# Patient Record
Sex: FEMALE | Race: WHITE | NOT HISPANIC OR LATINO
[De-identification: names, ages, dates, MRNs, and addresses within clinical notes are randomized per-mention and may not be internally consistent; named-entity substitution may affect disease eponyms.]

---

## 2017-01-12 ENCOUNTER — APPOINTMENT (OUTPATIENT)
Dept: PULMONOLOGY | Facility: CLINIC | Age: 67
End: 2017-01-12

## 2017-01-12 VITALS
DIASTOLIC BLOOD PRESSURE: 70 MMHG | SYSTOLIC BLOOD PRESSURE: 115 MMHG | WEIGHT: 135 LBS | BODY MASS INDEX: 22.47 KG/M2 | OXYGEN SATURATION: 96 % | HEART RATE: 69 BPM

## 2017-03-13 ENCOUNTER — MEDICATION RENEWAL (OUTPATIENT)
Age: 67
End: 2017-03-13

## 2017-04-05 ENCOUNTER — RX RENEWAL (OUTPATIENT)
Age: 67
End: 2017-04-05

## 2017-05-02 ENCOUNTER — RX RENEWAL (OUTPATIENT)
Age: 67
End: 2017-05-02

## 2017-05-11 ENCOUNTER — APPOINTMENT (OUTPATIENT)
Dept: PULMONOLOGY | Facility: CLINIC | Age: 67
End: 2017-05-11

## 2017-05-11 VITALS
BODY MASS INDEX: 23.32 KG/M2 | WEIGHT: 140 LBS | SYSTOLIC BLOOD PRESSURE: 112 MMHG | DIASTOLIC BLOOD PRESSURE: 80 MMHG | RESPIRATION RATE: 14 BRPM | HEART RATE: 70 BPM | OXYGEN SATURATION: 96 % | HEIGHT: 65 IN

## 2017-07-02 ENCOUNTER — RX RENEWAL (OUTPATIENT)
Age: 67
End: 2017-07-02

## 2017-07-30 ENCOUNTER — RX RENEWAL (OUTPATIENT)
Age: 67
End: 2017-07-30

## 2017-09-27 ENCOUNTER — RX RENEWAL (OUTPATIENT)
Age: 67
End: 2017-09-27

## 2017-10-19 ENCOUNTER — RX RENEWAL (OUTPATIENT)
Age: 67
End: 2017-10-19

## 2017-10-26 ENCOUNTER — APPOINTMENT (OUTPATIENT)
Dept: PULMONOLOGY | Facility: CLINIC | Age: 67
End: 2017-10-26
Payer: MEDICARE

## 2017-10-26 VITALS
WEIGHT: 140 LBS | OXYGEN SATURATION: 98 % | DIASTOLIC BLOOD PRESSURE: 80 MMHG | HEIGHT: 65 IN | HEART RATE: 61 BPM | SYSTOLIC BLOOD PRESSURE: 118 MMHG | BODY MASS INDEX: 23.32 KG/M2

## 2017-10-26 PROCEDURE — 94010 BREATHING CAPACITY TEST: CPT

## 2017-10-26 PROCEDURE — G0008: CPT

## 2017-10-26 PROCEDURE — 90662 IIV NO PRSV INCREASED AG IM: CPT

## 2017-10-26 PROCEDURE — 99214 OFFICE O/P EST MOD 30 MIN: CPT | Mod: 25

## 2017-10-27 ENCOUNTER — RX RENEWAL (OUTPATIENT)
Age: 67
End: 2017-10-27

## 2017-11-01 ENCOUNTER — APPOINTMENT (OUTPATIENT)
Dept: PULMONOLOGY | Facility: CLINIC | Age: 67
End: 2017-11-01

## 2017-12-27 ENCOUNTER — RX RENEWAL (OUTPATIENT)
Age: 67
End: 2017-12-27

## 2018-01-03 ENCOUNTER — RX RENEWAL (OUTPATIENT)
Age: 68
End: 2018-01-03

## 2018-02-10 ENCOUNTER — RX RENEWAL (OUTPATIENT)
Age: 68
End: 2018-02-10

## 2018-03-01 ENCOUNTER — APPOINTMENT (OUTPATIENT)
Dept: PULMONOLOGY | Facility: CLINIC | Age: 68
End: 2018-03-01
Payer: MEDICARE

## 2018-03-01 VITALS
BODY MASS INDEX: 23.49 KG/M2 | WEIGHT: 141 LBS | OXYGEN SATURATION: 100 % | SYSTOLIC BLOOD PRESSURE: 100 MMHG | DIASTOLIC BLOOD PRESSURE: 62 MMHG | HEIGHT: 65 IN | RESPIRATION RATE: 14 BRPM | HEART RATE: 68 BPM

## 2018-03-01 DIAGNOSIS — Z23 ENCOUNTER FOR IMMUNIZATION: ICD-10-CM

## 2018-03-01 PROCEDURE — 90732 PPSV23 VACC 2 YRS+ SUBQ/IM: CPT

## 2018-03-01 PROCEDURE — 94010 BREATHING CAPACITY TEST: CPT

## 2018-03-01 PROCEDURE — 99214 OFFICE O/P EST MOD 30 MIN: CPT | Mod: 25

## 2018-03-01 PROCEDURE — G0009: CPT

## 2018-03-01 RX ORDER — AZELASTINE HYDROCHLORIDE 137 UG/1
0.1 SPRAY, METERED NASAL TWICE DAILY
Qty: 3 | Refills: 1 | Status: DISCONTINUED | COMMUNITY
Start: 2017-10-26 | End: 2018-03-01

## 2018-03-01 RX ORDER — OLOPATADINE HYDROCHLORIDE 665 UG/1
0.6 SPRAY, METERED NASAL
Qty: 3 | Refills: 1 | Status: DISCONTINUED | COMMUNITY
Start: 2017-05-11 | End: 2018-03-01

## 2018-03-28 ENCOUNTER — RX RENEWAL (OUTPATIENT)
Age: 68
End: 2018-03-28

## 2018-04-21 ENCOUNTER — RX RENEWAL (OUTPATIENT)
Age: 68
End: 2018-04-21

## 2018-06-07 ENCOUNTER — RX RENEWAL (OUTPATIENT)
Age: 68
End: 2018-06-07

## 2018-06-23 ENCOUNTER — RX RENEWAL (OUTPATIENT)
Age: 68
End: 2018-06-23

## 2018-06-25 ENCOUNTER — RX RENEWAL (OUTPATIENT)
Age: 68
End: 2018-06-25

## 2018-07-02 ENCOUNTER — APPOINTMENT (OUTPATIENT)
Dept: PULMONOLOGY | Facility: CLINIC | Age: 68
End: 2018-07-02

## 2018-07-05 ENCOUNTER — NON-APPOINTMENT (OUTPATIENT)
Age: 68
End: 2018-07-05

## 2018-07-05 ENCOUNTER — APPOINTMENT (OUTPATIENT)
Dept: PULMONOLOGY | Facility: CLINIC | Age: 68
End: 2018-07-05
Payer: MEDICARE

## 2018-07-05 VITALS
SYSTOLIC BLOOD PRESSURE: 120 MMHG | BODY MASS INDEX: 23.32 KG/M2 | OXYGEN SATURATION: 98 % | WEIGHT: 140 LBS | HEIGHT: 65 IN | DIASTOLIC BLOOD PRESSURE: 80 MMHG | HEART RATE: 78 BPM

## 2018-07-05 PROCEDURE — 99214 OFFICE O/P EST MOD 30 MIN: CPT | Mod: 25

## 2018-07-05 PROCEDURE — 94010 BREATHING CAPACITY TEST: CPT

## 2018-07-18 ENCOUNTER — RX RENEWAL (OUTPATIENT)
Age: 68
End: 2018-07-18

## 2018-09-12 ENCOUNTER — RX RENEWAL (OUTPATIENT)
Age: 68
End: 2018-09-12

## 2018-09-21 ENCOUNTER — RX RENEWAL (OUTPATIENT)
Age: 68
End: 2018-09-21

## 2018-10-22 ENCOUNTER — RX RENEWAL (OUTPATIENT)
Age: 68
End: 2018-10-22

## 2018-10-24 ENCOUNTER — APPOINTMENT (OUTPATIENT)
Dept: PULMONOLOGY | Facility: CLINIC | Age: 68
End: 2018-10-24
Payer: MEDICARE

## 2018-10-24 PROCEDURE — G0008: CPT

## 2018-10-24 PROCEDURE — 90686 IIV4 VACC NO PRSV 0.5 ML IM: CPT

## 2018-11-01 ENCOUNTER — APPOINTMENT (OUTPATIENT)
Dept: PULMONOLOGY | Facility: CLINIC | Age: 68
End: 2018-11-01

## 2018-12-05 ENCOUNTER — APPOINTMENT (OUTPATIENT)
Dept: PULMONOLOGY | Facility: CLINIC | Age: 68
End: 2018-12-05
Payer: MEDICARE

## 2018-12-05 ENCOUNTER — NON-APPOINTMENT (OUTPATIENT)
Age: 68
End: 2018-12-05

## 2018-12-05 VITALS
WEIGHT: 142 LBS | RESPIRATION RATE: 16 BRPM | DIASTOLIC BLOOD PRESSURE: 90 MMHG | HEART RATE: 66 BPM | BODY MASS INDEX: 24.24 KG/M2 | SYSTOLIC BLOOD PRESSURE: 130 MMHG | OXYGEN SATURATION: 98 % | HEIGHT: 64 IN

## 2018-12-05 PROCEDURE — 99214 OFFICE O/P EST MOD 30 MIN: CPT | Mod: 25

## 2018-12-05 PROCEDURE — 94010 BREATHING CAPACITY TEST: CPT

## 2018-12-05 RX ORDER — LORAZEPAM 0.5 MG/1
0.5 TABLET ORAL
Qty: 60 | Refills: 0 | Status: ACTIVE | COMMUNITY
Start: 2018-07-03

## 2018-12-05 NOTE — PROCEDURE
[FreeTextEntry1] : PFT - spi reveals mild restrictive dysfunction; FEV1 is 1.62 which is 71% of predicted, normal flow volume loop

## 2018-12-05 NOTE — REASON FOR VISIT
[Acute] : an acute visit [FreeTextEntry1] : sick visit; allergies, elevated IgE, eosinophilia, asthma, SOB;

## 2018-12-05 NOTE — ADDENDUM
[FreeTextEntry1] : All medical record entries made by conrad Sanchez were at Dr. Dorian Rojas's, direction and personally dictated by me on (12/5/2018). I have reviewed the chart and agree that the record accurately reflects my personal performance of the history, physical exam, assessment and plan. I have also personally directed, reviewed, and agree with the discharge instructions.

## 2018-12-05 NOTE — ASSESSMENT
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 She is doing well from a pulmonary perspective aside from her mild allergy symptoms. \par \par problem 1: moderate persistent asthma\par -continue to use Advair 250 at 1 inhalation BID\par -continue to use Spiriva 2 inhalations QD\par -continue to use Singulair 10 mg before bed\par \par -Asthma is believed to be caused by inherited (genetic) and environmental factor, but its exact cause is unknown. Asthma may be triggered by allergens, lung infections, or irritants in the air. Asthma triggers are different for each person\par -Inhaler technique reviewed as well as oral hygiene techniques reviewed with patient. Avoidance of cold air, extremes of temperature, rescue inhaler should be used before exercise. Order of medication reviewed with patient. Recommended use of a cool mist humidifier in the bedroom.\par \par problem 2: elevated IgE level and eosinophilia\par -she remains a candidate for Xolair and Nucala, respectfully \par -no need to implement at this point in time\par \par -The safety and efficacy of Nucala was established in three double-blind, randomized, placebo-controlled trials in patients with severe asthma. Compared to a placebo, patients with severe asthma receiving Nucala had fewer exacerbation requiring hospitalization and/or emergency department visits, and a longer time to first exacerbation. In addition, patients with severe asthma receiving Nucala experienced greater reductions in their daily maintenance oral corticosteroid dose, while maintaining asthma control compared with patients receiving placebo. Treatment with Nucala did not result in a significant improvement in lung function, as measured by the volume of air exhaled by patients in one second. The most common side effects include: headache, injection site reactions, back pain, weakness, and fatigue; hypersensitivity reactions can occur within hours or days including swelling of the face, mouth, and tongue, fainting, dizziness, hives, breathing problems, and rash; herpes zoster infections have occurred. The drug is a monoclonal antibody that inhibits interleukin -5 which helps regular eosinophils, a type of white blood cell that contributes to asthma. The over-production of eosinophils can cause inflammation in the lungs, increasing the frequency of asthma attacks. Patients must also take other medications, including high dose inhaled corticosteroids and at least one additional asthma drug.\par -Xolair is a recombinant DNA- derived humanized IgG1K monoclonal antibody that selectively binds ot human immunoglobulin E (IgE). Xolair is produced by a Chinese hamster ovary cell suspension culture in nutrient medium containing the antibiotic gentamicin. Gentamicin is not detectable in the final product. Xolair is a sterile, white, preservative free, lyophilized powder contained in a single use vial that is reconstituted with sterile water for suspension. Side effects include: wheezing, tightness of the chest, trouble breathing, hives, skin rash, feeling anxious or light-headed, fainting, warmth or tingling under skin, or swelling of face, lips, or tongue \par \par problem 3: allergic rhinitis\par -continue to use Clarinex 5 mg QHS\par -she is to continue nasal saline\par -followed by Astelin 0.15% BID\par -Xlear recommended\par -Environmental measures for allergies were encouraged including mattress and pillow cover, air purifier, and environmental controls. \par \par problem 4: URI\par -recommended to take Aida seltzer cold and sinus\par \par problem 5: poor sleep\par Good sleep hygiene was encouraged including avoiding watching television an hour before bed, keeping caffeine at a low, avoiding reading, television, or anything, in bed, no drinking any liquids three hours before bedtime, and only getting into bed when tired and ready for sleep. \par \par \par problem 8: health maintenance \par -flu shot - 2018\par -Pneumovax 23 given March 2018\par -she is being added CoQ-10 at 200 mg QD \par -recommended early intervention for URIs\par -recommended regular osteoporosis evaluations\par -recommended early dermatological evaluations\par -recommended after the age of 50 to the age of 70, colonoscopy every 5 years \par \par F/U in 4-6 months\par She is encouraged to call with any changes, concerns, or questions.

## 2018-12-05 NOTE — HISTORY OF PRESENT ILLNESS
[FreeTextEntry1] : Ms. Henson is a 68 year old female with a history of allergies, elevated IgE, eosinophilia, asthma, SOB, who presents to the office for a follow up visit. Her chief complaint is poor sleep.\par -she states that two days ago her sickness started, with coughing, nasal congestion and throat irritation. Some mucous comes up\par -she notes she has chronic itchy eyes; dependant of the weather\par -she reports that her breathing has been good\par -she states she gets SOB when she was climbing stairs in October but this did not reoccur\par -she reports that her bowels are regular and her senses of smell and taste are normal\par -she reports that she has trouble falling asleep\par -she denies any headaches, nausea, vomiting, fever, chills, sweats, chest pain, chest pressure, diarrhea, constipation, dysphagia, dizziness, leg swelling, leg pain, itchy ears, heartburn, reflux, or sour taste in the mouth.

## 2018-12-17 ENCOUNTER — RX RENEWAL (OUTPATIENT)
Age: 68
End: 2018-12-17

## 2019-01-14 ENCOUNTER — RX RENEWAL (OUTPATIENT)
Age: 69
End: 2019-01-14

## 2019-03-05 ENCOUNTER — RX RENEWAL (OUTPATIENT)
Age: 69
End: 2019-03-05

## 2019-03-21 ENCOUNTER — RX RENEWAL (OUTPATIENT)
Age: 69
End: 2019-03-21

## 2019-04-10 ENCOUNTER — APPOINTMENT (OUTPATIENT)
Dept: PULMONOLOGY | Facility: CLINIC | Age: 69
End: 2019-04-10
Payer: MEDICARE

## 2019-04-10 VITALS
BODY MASS INDEX: 23.32 KG/M2 | RESPIRATION RATE: 14 BRPM | SYSTOLIC BLOOD PRESSURE: 120 MMHG | OXYGEN SATURATION: 97 % | HEART RATE: 63 BPM | WEIGHT: 140 LBS | DIASTOLIC BLOOD PRESSURE: 80 MMHG | HEIGHT: 65 IN

## 2019-04-10 PROCEDURE — 99214 OFFICE O/P EST MOD 30 MIN: CPT | Mod: 25

## 2019-04-10 PROCEDURE — 94010 BREATHING CAPACITY TEST: CPT

## 2019-04-10 NOTE — HISTORY OF PRESENT ILLNESS
[FreeTextEntry1] : Ms. Henson is a 68 year old female with a history of allergies, elevated IgE, eosinophilia, asthma, SOB, who presents to the office for a follow up visit. Her chief complaint is weight. \par -she notes occasional vertigo. \par -she notes she is sleeping better. about 6 hours each night, waking up rested. \par -she notes she is exercising, using a stepper. \par -she notes she has chronic itchy eyes; dependant of the weather\par -she reports that her breathing has been good\par -she states she gets SOB when she was climbing stairs in October but this did not reoccur\par -she reports that her bowels are regular and her senses of smell and taste are normal\par -she denies any headaches, nausea, vomiting, fever, chills, sweats, chest pain, chest pressure, diarrhea, constipation, dysphagia, dizziness, leg swelling, leg pain, itchy ears, heartburn, reflux, or sour taste in the mouth.

## 2019-04-10 NOTE — PHYSICAL EXAM
[General Appearance - Well Developed] : well developed [Normal Appearance] : normal appearance [Well Groomed] : well groomed [General Appearance - Well Nourished] : well nourished [General Appearance - In No Acute Distress] : no acute distress [No Deformities] : no deformities [Normal Conjunctiva] : the conjunctiva exhibited no abnormalities [Eyelids - No Xanthelasma] : the eyelids demonstrated no xanthelasmas [Normal Oropharynx] : normal oropharynx [Neck Appearance] : the appearance of the neck was normal [II] : II [Neck Cervical Mass (___cm)] : no neck mass was observed [Jugular Venous Distention Increased] : there was no jugular-venous distention [Thyroid Diffuse Enlargement] : the thyroid was not enlarged [Thyroid Nodule] : there were no palpable thyroid nodules [Heart Rate And Rhythm] : heart rate and rhythm were normal [Heart Sounds] : normal S1 and S2 [Murmurs] : no murmurs present [Respiration, Rhythm And Depth] : normal respiratory rhythm and effort [Exaggerated Use Of Accessory Muscles For Inspiration] : no accessory muscle use [Auscultation Breath Sounds / Voice Sounds] : lungs were clear to auscultation bilaterally [Abdomen Soft] : soft [Abdomen Tenderness] : non-tender [Abdomen Mass (___ Cm)] : no abdominal mass palpated [Abnormal Walk] : normal gait [Gait - Sufficient For Exercise Testing] : the gait was sufficient for exercise testing [Nail Clubbing] : no clubbing of the fingernails [Cyanosis, Localized] : no localized cyanosis [Petechial Hemorrhages (___cm)] : no petechial hemorrhages [Skin Color & Pigmentation] : normal skin color and pigmentation [] : no rash [No Venous Stasis] : no venous stasis [Skin Lesions] : no skin lesions [No Skin Ulcers] : no skin ulcer [No Xanthoma] : no  xanthoma was observed [Deep Tendon Reflexes (DTR)] : deep tendon reflexes were 2+ and symmetric [Sensation] : the sensory exam was normal to light touch and pinprick [No Focal Deficits] : no focal deficits [Oriented To Time, Place, And Person] : oriented to person, place, and time [Impaired Insight] : insight and judgment were intact [Affect] : the affect was normal [FreeTextEntry1] : I:E ratio 1:3; clear

## 2019-04-10 NOTE — ADDENDUM
[FreeTextEntry1] : Documented by Najma Quach acting as a scribe for Dr. Dorian Rojas on 4/10/2019.\par \par All medical record entries made by the scribe, Najma Quach, were at my, Dr. Dorian Rojas's, direction and personally dictated by me on 4/10/2019. I have reviewed the chart and agree that the record accurately reflects my personal performance of the history, physical exam, assessment and plan. I have also personally directed, reviewed, and agree with the discharge instructions.

## 2019-04-10 NOTE — REASON FOR VISIT
[Follow-Up] : a follow-up visit [FreeTextEntry1] : allergies, elevated IgE, eosinophilic asthma, SOB;

## 2019-04-10 NOTE — ASSESSMENT
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 She is doing well from a pulmonary perspective aside from her mild allergy symptoms. (controlled)\par \par problem 1: moderate persistent asthma\par -continue to use Advair 250 at 1 inhalation BID\par -continue to use Spiriva 2 inhalations QD\par -continue to use Singulair 10 mg before bed\par \par -Asthma is believed to be caused by inherited (genetic) and environmental factor, but its exact cause is unknown. Asthma may be triggered by allergens, lung infections, or irritants in the air. Asthma triggers are different for each person\par -Inhaler technique reviewed as well as oral hygiene techniques reviewed with patient. Avoidance of cold air, extremes of temperature, rescue inhaler should be used before exercise. Order of medication reviewed with patient. Recommended use of a cool mist humidifier in the bedroom.\par \par problem 2: elevated IgE level and eosinophilia\par -she remains a candidate for Xolair and Nucala, respectfully \par -no need to implement at this point in time\par \par -The safety and efficacy of Nucala was established in three double-blind, randomized, placebo-controlled trials in patients with severe asthma. Compared to a placebo, patients with severe asthma receiving Nucala had fewer exacerbation requiring hospitalization and/or emergency department visits, and a longer time to first exacerbation. In addition, patients with severe asthma receiving Nucala experienced greater reductions in their daily maintenance oral corticosteroid dose, while maintaining asthma control compared with patients receiving placebo. Treatment with Nucala did not result in a significant improvement in lung function, as measured by the volume of air exhaled by patients in one second. The most common side effects include: headache, injection site reactions, back pain, weakness, and fatigue; hypersensitivity reactions can occur within hours or days including swelling of the face, mouth, and tongue, fainting, dizziness, hives, breathing problems, and rash; herpes zoster infections have occurred. The drug is a monoclonal antibody that inhibits interleukin -5 which helps regular eosinophils, a type of white blood cell that contributes to asthma. The over-production of eosinophils can cause inflammation in the lungs, increasing the frequency of asthma attacks. Patients must also take other medications, including high dose inhaled corticosteroids and at least one additional asthma drug.\par -Xolair is a recombinant DNA- derived humanized IgG1K monoclonal antibody that selectively binds ot human immunoglobulin E (IgE). Xolair is produced by a Chinese hamster ovary cell suspension culture in nutrient medium containing the antibiotic gentamicin. Gentamicin is not detectable in the final product. Xolair is a sterile, white, preservative free, lyophilized powder contained in a single use vial that is reconstituted with sterile water for suspension. Side effects include: wheezing, tightness of the chest, trouble breathing, hives, skin rash, feeling anxious or light-headed, fainting, warmth or tingling under skin, or swelling of face, lips, or tongue \par \par problem 3: allergic rhinitis\par -continue to use Clarinex 5 mg QHS\par -she is to continue nasal saline\par -followed by Astelin 0.15% BID\par -Xlear recommended\par -Environmental measures for allergies were encouraged including mattress and pillow cover, air purifier, and environmental controls. \par \par problem 4: URI (if present)\par -recommended to take Aida seltzer cold and sinus\par \par problem 5: poor sleep (improved)\par Good sleep hygiene was encouraged including avoiding watching television an hour before bed, keeping caffeine at a low, avoiding reading, television, or anything, in bed, no drinking any liquids three hours before bedtime, and only getting into bed when tired and ready for sleep. \par \par \par problem 8: health maintenance \par -flu shot - 2018\par -Pneumovax 23 given March 2018/ [id number] 11/2016\par -she is being added CoQ-10 at 200 mg QD \par -recommended early intervention for URIs\par -recommended regular osteoporosis evaluations\par -recommended early dermatological evaluations\par -recommended after the age of 50 to the age of 68, colonoscopy every 5 years \par \par F/U in 4-6 months\par She is encouraged to call with any changes, concerns, or questions.

## 2019-04-10 NOTE — PROCEDURE
[FreeTextEntry1] : PFT - spi reveals mild-mod obstructive dysfunction; FEV1 is 1.51 which is 65% of predicted, normal flow volume loop \par \par Patient refused the NiOx/FENO testing.

## 2019-06-12 ENCOUNTER — RX RENEWAL (OUTPATIENT)
Age: 69
End: 2019-06-12

## 2019-06-14 ENCOUNTER — RX RENEWAL (OUTPATIENT)
Age: 69
End: 2019-06-14

## 2019-07-14 ENCOUNTER — RX RENEWAL (OUTPATIENT)
Age: 69
End: 2019-07-14

## 2019-07-25 ENCOUNTER — RX RENEWAL (OUTPATIENT)
Age: 69
End: 2019-07-25

## 2019-08-14 ENCOUNTER — NON-APPOINTMENT (OUTPATIENT)
Age: 69
End: 2019-08-14

## 2019-08-14 ENCOUNTER — APPOINTMENT (OUTPATIENT)
Dept: PULMONOLOGY | Facility: CLINIC | Age: 69
End: 2019-08-14
Payer: MEDICARE

## 2019-08-14 VITALS
HEART RATE: 70 BPM | SYSTOLIC BLOOD PRESSURE: 120 MMHG | HEIGHT: 65 IN | RESPIRATION RATE: 14 BRPM | BODY MASS INDEX: 24.32 KG/M2 | OXYGEN SATURATION: 95 % | DIASTOLIC BLOOD PRESSURE: 80 MMHG | WEIGHT: 146 LBS

## 2019-08-14 PROCEDURE — 94010 BREATHING CAPACITY TEST: CPT

## 2019-08-14 PROCEDURE — 99214 OFFICE O/P EST MOD 30 MIN: CPT | Mod: 25

## 2019-08-14 NOTE — HISTORY OF PRESENT ILLNESS
[FreeTextEntry1] : Ms. Henson is a 68 year old female with a history of allergic rhinitis, elevated IgE, eosinophilic asthma, severe persistent asthma, and SOB presenting to the office today for a follow up visit. Her chief complaint is\par -She states that she has generally been feeling well \par -she has been sleeping well, getting about 7 hours of uninterrupted sleep per night\par -she reports occasional constipation \par -she notes that she has been exercising regularly, walking about 1 mile per day\par -her vision has been stable\par -she reports that her breathing has been generally stable/manageable; she will occasionally become SOB during a hot/humid day \par -she has frequent itchy eyes at night due to allergies. she uses OTC eye drops\par -she states that her bowels have been regular\par -she reports that her sense of taste and smell have been good \par -she denies any headaches, nausea, vomiting, fever, chills, sweats, chest pain, chest pressure, diarrhea, dysphagia, dizziness, leg swelling, leg pain, itchy ears, heartburn, reflux, or sour taste in the mouth, hoarseness.

## 2019-08-14 NOTE — REASON FOR VISIT
[Follow-Up] : a follow-up visit [FreeTextEntry1] : allergic rhinitis, elevated IgE, eosinophilic asthma, severe persistent asthma, and SOB

## 2019-08-14 NOTE — ADDENDUM
[FreeTextEntry1] : All medical record entries made by conrad Franklin were at Dr. Dorian Rojas's, direction and personally dictated by me on 08/14/2019. I have reviewed the chart and agree that the record accurately reflects my personal performance of the history, physical exam, assessment and plan. I have also personally directed, reviewed, and agree with the discharge instructions.

## 2019-08-14 NOTE — PHYSICAL EXAM
[Normal Appearance] : normal appearance [General Appearance - Well Developed] : well developed [Well Groomed] : well groomed [General Appearance - Well Nourished] : well nourished [No Deformities] : no deformities [General Appearance - In No Acute Distress] : no acute distress [Eyelids - No Xanthelasma] : the eyelids demonstrated no xanthelasmas [Normal Conjunctiva] : the conjunctiva exhibited no abnormalities [Normal Oropharynx] : normal oropharynx [Neck Appearance] : the appearance of the neck was normal [Jugular Venous Distention Increased] : there was no jugular-venous distention [Neck Cervical Mass (___cm)] : no neck mass was observed [Thyroid Nodule] : there were no palpable thyroid nodules [Thyroid Diffuse Enlargement] : the thyroid was not enlarged [Heart Sounds] : normal S1 and S2 [Heart Rate And Rhythm] : heart rate and rhythm were normal [Murmurs] : no murmurs present [Respiration, Rhythm And Depth] : normal respiratory rhythm and effort [Exaggerated Use Of Accessory Muscles For Inspiration] : no accessory muscle use [Auscultation Breath Sounds / Voice Sounds] : lungs were clear to auscultation bilaterally [Abdomen Soft] : soft [Abdomen Tenderness] : non-tender [Abdomen Mass (___ Cm)] : no abdominal mass palpated [Gait - Sufficient For Exercise Testing] : the gait was sufficient for exercise testing [Abnormal Walk] : normal gait [Nail Clubbing] : no clubbing of the fingernails [Cyanosis, Localized] : no localized cyanosis [Petechial Hemorrhages (___cm)] : no petechial hemorrhages [Skin Color & Pigmentation] : normal skin color and pigmentation [] : no rash [Skin Lesions] : no skin lesions [No Venous Stasis] : no venous stasis [No Skin Ulcers] : no skin ulcer [No Xanthoma] : no  xanthoma was observed [Sensation] : the sensory exam was normal to light touch and pinprick [Deep Tendon Reflexes (DTR)] : deep tendon reflexes were 2+ and symmetric [Oriented To Time, Place, And Person] : oriented to person, place, and time [No Focal Deficits] : no focal deficits [Affect] : the affect was normal [Impaired Insight] : insight and judgment were intact [II] : II [FreeTextEntry1] : I:E ratio 1:3; clear

## 2019-08-14 NOTE — PROCEDURE
[FreeTextEntry1] : PFT - spi reveals mild obstructive / restrictive dysfunction; FEV1 is 1.51 which is 64% of predicted, normal flow volume loop \par \par *Unable to perform. Pt refused after trying*

## 2019-08-14 NOTE — ASSESSMENT
[FreeTextEntry1] : Ms. Henson has a history of eosinophilic asthma, elevated IgE, allergies, HLD, and anxiety. She is doing well from a pulmonary perspective.\par \par Her shortness of breath is multifactorial due to:\par -poor mechanics of breathing \par -out of shape\par -pulmonary disease  \par \par problem 1: moderate persistent asthma\par -continue to use Advair 250 at 1 inhalation BID\par -continue to use Spiriva 2 inhalations QD\par -continue to use Singulair 10 mg before bed\par \par -Asthma is believed to be caused by inherited (genetic) and environmental factor, but its exact cause is unknown. Asthma may be triggered by allergens, lung infections, or irritants in the air. Asthma triggers are different for each person\par -Inhaler technique reviewed as well as oral hygiene techniques reviewed with patient. Avoidance of cold air, extremes of temperature, rescue inhaler should be used before exercise. Order of medication reviewed with patient. Recommended use of a cool mist humidifier in the bedroom.\par \par problem 2: elevated IgE level and eosinophilia\par -she remains a candidate for Xolair and Nucala, respectfully \par -no need to implement at this point in time\par \par -The safety and efficacy of Nucala was established in three double-blind, randomized, placebo-controlled trials in patients with severe asthma. Compared to a placebo, patients with severe asthma receiving Nucala had fewer exacerbation requiring hospitalization and/or emergency department visits, and a longer time to first exacerbation. In addition, patients with severe asthma receiving Nucala experienced greater reductions in their daily maintenance oral corticosteroid dose, while maintaining asthma control compared with patients receiving placebo. Treatment with Nucala did not result in a significant improvement in lung function, as measured by the volume of air exhaled by patients in one second. The most common side effects include: headache, injection site reactions, back pain, weakness, and fatigue; hypersensitivity reactions can occur within hours or days including swelling of the face, mouth, and tongue, fainting, dizziness, hives, breathing problems, and rash; herpes zoster infections have occurred. The drug is a monoclonal antibody that inhibits interleukin -5 which helps regular eosinophils, a type of white blood cell that contributes to asthma. The over-production of eosinophils can cause inflammation in the lungs, increasing the frequency of asthma attacks. Patients must also take other medications, including high dose inhaled corticosteroids and at least one additional asthma drug.\par -Xolair is a recombinant DNA- derived humanized IgG1K monoclonal antibody that selectively binds ot human immunoglobulin E (IgE). Xolair is produced by a Chinese hamster ovary cell suspension culture in nutrient medium containing the antibiotic gentamicin. Gentamicin is not detectable in the final product. Xolair is a sterile, white, preservative free, lyophilized powder contained in a single use vial that is reconstituted with sterile water for suspension. Side effects include: wheezing, tightness of the chest, trouble breathing, hives, skin rash, feeling anxious or light-headed, fainting, warmth or tingling under skin, or swelling of face, lips, or tongue \par \par problem 3: allergic rhinitis\par -continue to use Clarinex 5 mg QHS\par -recommended to use Xlear nasal saline spray \par -continue Astelin 0.15% BID\par -Environmental measures for allergies were encouraged including mattress and pillow cover, air purifier, and environmental controls. \par \par problem 4: URI (if present)\par -recommended to take Aida seltzer cold and sinus\par \par problem 5: poor sleep (improved)\par Good sleep hygiene was encouraged including avoiding watching television an hour before bed, keeping caffeine at a low, avoiding reading, television, or anything, in bed, no drinking any liquids three hours before bedtime, and only getting into bed when tired and ready for sleep. \par \par problem 6: poor breathing mechanics\par -Proper breathing techniques were reviewed with an emphasis of exhalation. Patient instructed to breath in for 1 second and out for four seconds. Patient was encouraged to not talk while walking. \par \par problem 7: out of shape\par -Weight loss, exercise, and diet control were discussed and are highly encouraged. Treatment options were given such as, aqua therapy, and contacting a nutritionist. Recommended to use the elliptical, stationary bike, less use of treadmill. Mindful eating was explained to the patient Obesity is associated with worsening asthma, shortness of breath, and potential for cardiac disease, diabetes, and other underlying medical conditions\par \par problem 8: anxiety\par -recommended to read: "The Gift of Maybe," by Marta Gonzalez \par \par problem 9:  health maintenance \par -flu shot - 2018\par -Pneumovax 23 given March 2018/ [id number] 11/2016\par -she is being added CoQ-10 at 200 mg QD \par -recommended early intervention for URIs\par -recommended regular osteoporosis evaluations\par -recommended early dermatological evaluations\par -recommended after the age of 50 to the age of 70, colonoscopy every 5 years \par \par F/U in 4-6 months\par She is encouraged to call with any changes, concerns, or questions.

## 2019-08-25 ENCOUNTER — RX RENEWAL (OUTPATIENT)
Age: 69
End: 2019-08-25

## 2019-10-08 ENCOUNTER — RX RENEWAL (OUTPATIENT)
Age: 69
End: 2019-10-08

## 2019-10-10 ENCOUNTER — APPOINTMENT (OUTPATIENT)
Dept: PULMONOLOGY | Facility: CLINIC | Age: 69
End: 2019-10-10
Payer: MEDICARE

## 2019-10-10 PROCEDURE — G0008: CPT

## 2019-10-10 PROCEDURE — 90662 IIV NO PRSV INCREASED AG IM: CPT

## 2019-10-30 ENCOUNTER — APPOINTMENT (OUTPATIENT)
Dept: PULMONOLOGY | Facility: CLINIC | Age: 69
End: 2019-10-30

## 2019-12-06 ENCOUNTER — RX RENEWAL (OUTPATIENT)
Age: 69
End: 2019-12-06

## 2019-12-09 ENCOUNTER — RX RENEWAL (OUTPATIENT)
Age: 69
End: 2019-12-09

## 2019-12-18 ENCOUNTER — APPOINTMENT (OUTPATIENT)
Dept: PULMONOLOGY | Facility: CLINIC | Age: 69
End: 2019-12-18
Payer: MEDICARE

## 2019-12-18 ENCOUNTER — NON-APPOINTMENT (OUTPATIENT)
Age: 69
End: 2019-12-18

## 2019-12-18 VITALS
WEIGHT: 145 LBS | DIASTOLIC BLOOD PRESSURE: 85 MMHG | HEART RATE: 62 BPM | BODY MASS INDEX: 24.75 KG/M2 | SYSTOLIC BLOOD PRESSURE: 130 MMHG | HEIGHT: 64 IN | RESPIRATION RATE: 17 BRPM | OXYGEN SATURATION: 92 %

## 2019-12-18 PROCEDURE — 95012 NITRIC OXIDE EXP GAS DETER: CPT

## 2019-12-18 PROCEDURE — 94010 BREATHING CAPACITY TEST: CPT

## 2019-12-18 PROCEDURE — 99214 OFFICE O/P EST MOD 30 MIN: CPT | Mod: 25

## 2019-12-18 NOTE — HISTORY OF PRESENT ILLNESS
[FreeTextEntry1] : Ms. Henson is a 69 year old female with a history of allergic rhinitis, elevated IgE, eosinophilic asthma, severe persistent asthma, and SOB presenting to the office today for a follow up visit. Her chief complaint is carpal tunnel sx.\par -she is currently battling carpal tunnel in both hands (left worse than right), and got 2 cortisone shots last night\par -she reports she has been feeling generally well\par -she notes she has occasional constipation\par -she notes her vision is stable\par -she reports her senses of smell and taste are good \par -she notes she gets myalgias and arthralgias in her legs, neck and back\par -she reports her weight is stable\par -she reports her energy level is good at 7/10\par -she denies taking any new medications, vitamins, or supplements. \par -she denies any chest pain, chest pressure, diarrhea, dysphagia, dizziness, sour taste in the mouth, heartburn, reflux

## 2019-12-18 NOTE — PROCEDURE
[FreeTextEntry1] : PFT revealed mild restrictive and moderate obstructive dysfunction, with a FEV1 of 1.40L, which is 62% of predicted, with a normal flow volume loop\par \par FENO was 36; a normal value being less than 25\par Fractional exhaled nitric oxide (FENO) is regarded as a simple, noninvasive method for assessing eosinophilic airway inflammation. Produced by a variety of cells within the lung, nitric oxide (NO) concentrations are generally low in healthy individuals. However, high concentrations of NO appear to be involved in nonspecific host defense mechanisms and chronic inflammatory diseases such as asthma. The American Thoracic Society (ATS) therefore has recommended using FENO to aid in the diagnosis and monitoring of eosinophilic airway inflammation and asthma, and for identifying steroid responsive individuals whose chronic respiratory symptoms may be caused by airway inflammation.

## 2019-12-18 NOTE — PHYSICAL EXAM
[General Appearance - Well Developed] : well developed [Normal Appearance] : normal appearance [Well Groomed] : well groomed [No Deformities] : no deformities [General Appearance - Well Nourished] : well nourished [Normal Conjunctiva] : the conjunctiva exhibited no abnormalities [General Appearance - In No Acute Distress] : no acute distress [Normal Oropharynx] : normal oropharynx [Eyelids - No Xanthelasma] : the eyelids demonstrated no xanthelasmas [III] : III [Jugular Venous Distention Increased] : there was no jugular-venous distention [Neck Cervical Mass (___cm)] : no neck mass was observed [Neck Appearance] : the appearance of the neck was normal [Thyroid Nodule] : there were no palpable thyroid nodules [Thyroid Diffuse Enlargement] : the thyroid was not enlarged [Heart Rate And Rhythm] : heart rate and rhythm were normal [Heart Sounds] : normal S1 and S2 [Murmurs] : no murmurs present [Auscultation Breath Sounds / Voice Sounds] : lungs were clear to auscultation bilaterally [Exaggerated Use Of Accessory Muscles For Inspiration] : no accessory muscle use [Respiration, Rhythm And Depth] : normal respiratory rhythm and effort [Abdomen Soft] : soft [Abnormal Walk] : normal gait [Abdomen Tenderness] : non-tender [Abdomen Mass (___ Cm)] : no abdominal mass palpated [Nail Clubbing] : no clubbing of the fingernails [Gait - Sufficient For Exercise Testing] : the gait was sufficient for exercise testing [Cyanosis, Localized] : no localized cyanosis [Petechial Hemorrhages (___cm)] : no petechial hemorrhages [Skin Color & Pigmentation] : normal skin color and pigmentation [No Venous Stasis] : no venous stasis [Skin Lesions] : no skin lesions [] : no rash [Deep Tendon Reflexes (DTR)] : deep tendon reflexes were 2+ and symmetric [No Skin Ulcers] : no skin ulcer [No Xanthoma] : no  xanthoma was observed [No Focal Deficits] : no focal deficits [Sensation] : the sensory exam was normal to light touch and pinprick [Impaired Insight] : insight and judgment were intact [Affect] : the affect was normal [Oriented To Time, Place, And Person] : oriented to person, place, and time [FreeTextEntry1] : I:E ratio 1:3; clear

## 2019-12-18 NOTE — REVIEW OF SYSTEMS
[As Noted in HPI] : as noted in HPI [Myalgias] : myalgias [Back Pain] : ~T back pain [Arthralgias] : arthralgias [Negative] : Sleep Disorder [Chest Discomfort] : no chest discomfort [Heartburn] : no heartburn [Dysphagia] : no dysphagia [Reflux] : no reflux [Constipation] : no constipation [Diarrhea] : no diarrhea

## 2019-12-18 NOTE — ADDENDUM
[FreeTextEntry1] : Documented by Mark Mendez acting as a scribe for Dr. Dorian Rojas on 12/18/2019.\par \par All medical record entries made by the Scribe were at my, Dr. Dorian Rojas's, direction and personally dictated by me on 12/18/2019. I have reviewed the chart and agree that the record accurately reflects my personal performance of the history, physical exam, assessment and plan. I have also personally directed, reviewed, and agree with the discharge instructions.

## 2019-12-18 NOTE — ASSESSMENT
[FreeTextEntry1] : Ms. Henson has a history of eosinophilic asthma, elevated IgE, allergies, HLD, and anxiety. She is doing well from a pulmonary perspective. Her number one issue is orthopedic (neck / carpal tunnel)\par \par Her shortness of breath is multifactorial due to:\par -poor mechanics of breathing \par -out of shape\par -pulmonary disease  \par \par problem 1: moderate persistent asthma\par -continue to use Advair 250 at 1 inhalation BID or Wixela 250 mg 1 inhalation BID\par -continue to use Spiriva 2 inhalations QD\par -continue to use Singulair 10 mg before bed\par \par -Asthma is believed to be caused by inherited (genetic) and environmental factor, but its exact cause is unknown. Asthma may be triggered by allergens, lung infections, or irritants in the air. Asthma triggers are different for each person\par -Inhaler technique reviewed as well as oral hygiene techniques reviewed with patient. Avoidance of cold air, extremes of temperature, rescue inhaler should be used before exercise. Order of medication reviewed with patient. Recommended use of a cool mist humidifier in the bedroom.\par \par problem 2: elevated IgE level and eosinophilia\par -she remains a candidate for Xolair and Nucala, respectfully \par -no need to implement at this point in time\par \par -The safety and efficacy of Nucala was established in three double-blind, randomized, placebo-controlled trials in patients with severe asthma. Compared to a placebo, patients with severe asthma receiving Nucala had fewer exacerbation requiring hospitalization and/or emergency department visits, and a longer time to first exacerbation. In addition, patients with severe asthma receiving Nucala experienced greater reductions in their daily maintenance oral corticosteroid dose, while maintaining asthma control compared with patients receiving placebo. Treatment with Nucala did not result in a significant improvement in lung function, as measured by the volume of air exhaled by patients in one second. The most common side effects include: headache, injection site reactions, back pain, weakness, and fatigue; hypersensitivity reactions can occur within hours or days including swelling of the face, mouth, and tongue, fainting, dizziness, hives, breathing problems, and rash; herpes zoster infections have occurred. The drug is a monoclonal antibody that inhibits interleukin -5 which helps regular eosinophils, a type of white blood cell that contributes to asthma. The over-production of eosinophils can cause inflammation in the lungs, increasing the frequency of asthma attacks. Patients must also take other medications, including high dose inhaled corticosteroids and at least one additional asthma drug.\par -Xolair is a recombinant DNA- derived humanized IgG1K monoclonal antibody that selectively binds ot human immunoglobulin E (IgE). Xolair is produced by a Chinese hamster ovary cell suspension culture in nutrient medium containing the antibiotic gentamicin. Gentamicin is not detectable in the final product. Xolair is a sterile, white, preservative free, lyophilized powder contained in a single use vial that is reconstituted with sterile water for suspension. Side effects include: wheezing, tightness of the chest, trouble breathing, hives, skin rash, feeling anxious or light-headed, fainting, warmth or tingling under skin, or swelling of face, lips, or tongue \par \par problem 3: allergic rhinitis\par -continue to use Clarinex 5 mg QHS\par -recommended to use Xlear nasal saline spray \par -continue Astelin 0.15% BID\par -Environmental measures for allergies were encouraged including mattress and pillow cover, air purifier, and environmental controls. \par \par problem 4: URI (if present)\par -recommended to take Aida seltzer cold and sinus\par \par problem 5: poor sleep (improved)\par Good sleep hygiene was encouraged including avoiding watching television an hour before bed, keeping caffeine at a low, avoiding reading, television, or anything, in bed, no drinking any liquids three hours before bedtime, and only getting into bed when tired and ready for sleep. \par \par problem 6: poor breathing mechanics\par -Proper breathing techniques were reviewed with an emphasis of exhalation. Patient instructed to breath in for 1 second and out for four seconds. Patient was encouraged to not talk while walking. \par \par problem 7: out of shape\par -Weight loss, exercise, and diet control were discussed and are highly encouraged. Treatment options were given such as, aqua therapy, and contacting a nutritionist. Recommended to use the elliptical, stationary bike, less use of treadmill. Mindful eating was explained to the patient Obesity is associated with worsening asthma, shortness of breath, and potential for cardiac disease, diabetes, and other underlying medical conditions\par \par problem 8: anxiety\par -recommended to read: "The Gift of Maybe," by Marta Gonzalez \par \par problem 9:  health maintenance \par -Recommended to take alpha lipoic acid and L-glutamine\par -flu shot - 2019\par -Pneumovax 23 given March 2018/ [id number] 11/2016\par -she is being added CoQ-10 at 200 mg QD \par -recommended early intervention for URIs\par -recommended regular osteoporosis evaluations\par -recommended early dermatological evaluations\par -recommended after the age of 50 to the age of 68, colonoscopy every 5 years \par \par F/U in 4-6 months with SPI and NiOx\par She is encouraged to call with any changes, concerns, or questions.

## 2019-12-30 ENCOUNTER — RX RENEWAL (OUTPATIENT)
Age: 69
End: 2019-12-30

## 2020-01-09 ENCOUNTER — RX RENEWAL (OUTPATIENT)
Age: 70
End: 2020-01-09

## 2020-01-20 ENCOUNTER — APPOINTMENT (OUTPATIENT)
Dept: ORTHOPEDIC SURGERY | Facility: CLINIC | Age: 70
End: 2020-01-20
Payer: MEDICARE

## 2020-01-20 VITALS — HEIGHT: 64 IN | BODY MASS INDEX: 24.75 KG/M2 | WEIGHT: 145 LBS

## 2020-01-20 DIAGNOSIS — M70.71 OTHER BURSITIS OF HIP, RIGHT HIP: ICD-10-CM

## 2020-01-20 PROCEDURE — 99204 OFFICE O/P NEW MOD 45 MIN: CPT

## 2020-01-20 PROCEDURE — 72170 X-RAY EXAM OF PELVIS: CPT

## 2020-01-20 PROCEDURE — 72100 X-RAY EXAM L-S SPINE 2/3 VWS: CPT

## 2020-01-20 PROCEDURE — 72050 X-RAY EXAM NECK SPINE 4/5VWS: CPT

## 2020-01-23 ENCOUNTER — APPOINTMENT (OUTPATIENT)
Dept: MRI IMAGING | Facility: CLINIC | Age: 70
End: 2020-01-23

## 2020-02-06 ENCOUNTER — APPOINTMENT (OUTPATIENT)
Dept: ORTHOPEDIC SURGERY | Facility: CLINIC | Age: 70
End: 2020-02-06
Payer: MEDICARE

## 2020-02-06 VITALS — BODY MASS INDEX: 24.75 KG/M2 | HEIGHT: 64 IN | WEIGHT: 145 LBS

## 2020-02-06 DIAGNOSIS — M47.812 SPONDYLOSIS W/OUT MYELOPATHY OR RADICULOPATHY, CERVICAL REGION: ICD-10-CM

## 2020-02-06 DIAGNOSIS — M48.02 SPINAL STENOSIS, CERVICAL REGION: ICD-10-CM

## 2020-02-06 DIAGNOSIS — M47.816 SPONDYLOSIS W/OUT MYELOPATHY OR RADICULOPATHY, LUMBAR REGION: ICD-10-CM

## 2020-02-06 DIAGNOSIS — M16.0 BILATERAL PRIMARY OSTEOARTHRITIS OF HIP: ICD-10-CM

## 2020-02-06 PROCEDURE — 99214 OFFICE O/P EST MOD 30 MIN: CPT

## 2020-02-06 NOTE — PHYSICAL EXAM
[de-identified] : Constitutional\par o Appearance : well-nourished, well developed, alert, in no acute distress \par Head and Face\par o Head :\par ¦ Inspection : atraumatic, normocephalic\par o Face :\par ¦ Inspection : no visible rash or discoloration\par Respiratory\par o Respiratory Effort: breathing unlabored \par Neurologic\par o Sensation : Normal sensation \par Psychiatric\par o Mood and Affect: mood normal, affect appropriate \par Lymphatic\par o Additional Nodes : No palpable lymph nodes present \par \par o Cervical Spine\par ¦ Inspection/Palpation : alignment midline, normal degree of lordosis present, mild left and right paracervical tenderness \par ¦ Range of Motion : sidebending causes discomfort but does not exacerbate her symptoms \par ¦ Skin : normal appearance, no masses or tenderness, trachea midline\par Tests: Negative Spurling’s test\par \par Right Upper Extremity\par o Right Shoulder :\par ¦ Inspection/Palpation : no tenderness, swelling or deformities\par ¦ Range of Motion : full and painless in all planes, no crepitance\par ¦ Strength :  forward elevation, internal rotation 5/5, external rotation 5/5, external rotation at 90 degrees of abduction, supraspinatus, adduction and abduction, biceps/triceps, 5/5\par ¦ Stability : no joint instability on provocative testing \par Tests: Mayes negative, Neer negative, negative drop arm test\par \par O Right Wrist:\par ¦ Inspection/Palpation : no tenderness, swelling or deformities\par ¦ Range of Motion : full and painless in all planes, no crepitance\par ¦ Strength : extension, flexion, ulnar deviation and radial deviation 5/5\par ¦ Stability : no joint instability on provocative testing\par ¦ Tests/Signs : Tinel's sign negative over carpal tunnel , negative Phalen’s, negative Finkelstein’s test \par \par o Right Hand :\par ¦ Inspection/Palpation : no tenderness to palpation or pain with axial compression\par ¦ Range of Motion : full arc of motion in the small joints of the hand, no discomfort elicited\par ¦ Strength : all intrinsic and extrinsic hand muscles 5/5\par ¦ Stability : no joint instability on provocative testing\par o Sensation : sensation intact to light touch\par o Skin : no skin lesions or discoloration \par \par Left Upper Extremity\par o Left Shoulder :\par ¦ Inspection/Palpation : no tenderness, swelling or deformities\par ¦ Range of Motion : full and painless in all planes, no crepitance\par ¦ Strength :  forward elevation, internal rotation 5/5, external rotation 5/5, external rotation at 90 degrees of abduction, supraspinatus, adduction and abduction, biceps/triceps, 5/5\par ¦ Stability : no joint instability on provocative testing\par  Tests: Mayes negative, negative Neer and Cherelle test, negative drop arm test\par \par o Left Wrist:\par ¦ Inspection/Palpation : no tenderness, swelling or deformities\par ¦ Range of Motion : full and painless in all planes, no crepitance\par ¦ Strength : extension, flexion, ulnar deviation and radial deviation 5/5\par ¦ Stability : no joint instability on provocative testing\par ¦ Tests/Signs : Tinel's sign negative over carpal tunnel, negative Phalen’s, negative Finkelstein’s test \par \par o Left Hand :\par ¦ Inspection/Palpation : no tenderness to palpation or pain with axial compression\par ¦ Range of Motion : full arc of motion in the small joints of the hand, no discomfort elicited\par ¦ Strength : all intrinsic and extrinsic hand muscles 5/5, good  strength\par ¦ Stability : no joint instability on provocative testing,\par o Sensation : sensation intact to light touch, normal sensation to pin prick. \par o Skin : no skin lesions or discoloration  \par \par \par Lumbosacral Spine\par o Inspection : no visible rash or discoloration\par o Palpation : paraspinal musculature nontender\par o Range of Motion : full and painless arc of motion in all planes\par o Muscle Strength : paraspinal muscle strength and tone within normal limits\par o Muscle Tone : paraspinal muscle strength and tone within normal limits\par Tests: straight leg test negative\par  \par Right Lower Extremity\par o Buttock : no tenderness, swelling or deformities\par o Right Hip :\par ¦ Inspection/Palpation : no tenderness, swelling or deformities\par ¦ Range of Motion : severe painful rotation, no crepitance\par ¦ Stability : joint stability intact\par ¦ Strength : extension, flexion, adduction, abduction, internal rotation and external rotation 3+/5 \par \par Left Lower Extremity\par o Buttock : no tenderness, swelling or deformities \par o Left Hip :\par ¦ Inspection/Palpation : no tenderness, swelling or deformities\par ¦ Range of Motion : full and painless in all planes, no crepitance\par ¦ Stability : joint stability intact\par ¦ Strength : extension, flexion, adduction, abduction, internal rotation and external rotation 4-/5\par \par Gait and Station:\par Gait: gait normal, no significant extremity swelling or lymphedema, good proprioception and balance, no foot drop bilaterally, leg lengths are equal.

## 2020-02-06 NOTE — ADDENDUM
[FreeTextEntry1] : I, Kenney Villa , acted solely as a scribe for Dr. Ryne Troncoso on this date 02/06/2020.\par All medical record entries made by the Scribe were at my, Dr. Ryne Troncoso, direction and personally dictated by me on 02/06/2020. I have reviewed the chart and agree that the record accurately reflects my personal performance of the history, physical exam, assessment and plan. I have also personally directed, reviewed, and agreed with the chart.

## 2020-02-06 NOTE — HISTORY OF PRESENT ILLNESS
[de-identified] : 69 year old RHD patient is present for an follow up consultation for her neck and low back pain. She notes that her neck has been bothering her for about 2 months. There was no injury. She was seen by her PCP who gave her a cervical collar. She had been wearing that but also started PT in the meantime. The PT has been helping. She complains of bilateral hand numbness and tingling, left worse than right.  She feels as if she is loosing dexterity and is dropping things constantly. She was seen by Dr. Villarreal, who did xrays and gave her cortisone in both wrists. She had also been wearing braces bilaterally, which has not been helping. He ruled out carpal tunnel. She has been doing therapy for her hands. She has not seen significant relief. She has not had imaging of her neck. Her lower back and lateral right  hip has also been bothering her since right before [place]. She notes that it started after decorating, which involved going up and down a ladder. Since then, she has had some pain when walking and sleeping at night. Her pain does radiate into her right lateral hip and radiates to above her right knee. She denies numbness and tingling into her leg. She does feel her right leg will give out at times. She has not been seen for her back and hip.  She also notes that she is losing balance. She is present with her daughter. \par \par MRI of the cervical spine taken on 1/24/2020 revealed:\par 1. Straightening of the normal cervical lordosis\par 2. Moderate to large central broad-based disc herniation, C3/C4 , with mild facet arthropathy. There is modeate to marked spinal stenosis and cord compression, with patchy suggested myelomalacia.\par 3. Mild posterior bulging disc annulus, C2/C3. There is mild thecal sac deformity.\par 4. grade 1 spondylolisthesis, C4/C5, more right sided. A small central and right lateral disc herniation is noted, contributing to mild right-sided cord deformity and moderate right C5 root compression.\par 5. Small Central disc herniations, C5/C6 and C6/C7, with bilateral foraminal bony productive change. There is very mild midline cord deformity at both levels, with moderate bilateral foraminal narrowing.

## 2020-02-06 NOTE — DISCUSSION/SUMMARY
[de-identified] : I discussed the underlying pathophysiology of the patient's condition in great detail with the patient. I went over the patient's MRI with them in great detail. We discussed the use of ice, Tylenol and anti-inflammatories to relieve pain. The patient was instructed in ROM exercises they are to do at home. At-home strengthening, and stretching exercises were discussed and demonstrated with the patient. Based off the results of the MRI I am referring the patient to  neuro/spine surgeons in order to provide her with long term relief. I informed the patient that surgery will be required to provide them long term relief from their symptoms. We had a lengthy discussion about the patient's issues, and talked about the benefits and risks of the procedure. In the meantime she should wear a soft collar. She should continue PT for her back and hip. \par \par FU with Neuro/Spine surgeon. \par FU for right hip One month.

## 2020-02-10 ENCOUNTER — TRANSCRIPTION ENCOUNTER (OUTPATIENT)
Age: 70
End: 2020-02-10

## 2020-02-10 ENCOUNTER — OUTPATIENT (OUTPATIENT)
Dept: OUTPATIENT SERVICES | Facility: HOSPITAL | Age: 70
LOS: 1 days | End: 2020-02-10

## 2020-02-10 VITALS
HEIGHT: 64.5 IN | RESPIRATION RATE: 16 BRPM | OXYGEN SATURATION: 98 % | TEMPERATURE: 98 F | HEART RATE: 78 BPM | DIASTOLIC BLOOD PRESSURE: 85 MMHG | WEIGHT: 143.96 LBS | SYSTOLIC BLOOD PRESSURE: 132 MMHG

## 2020-02-10 DIAGNOSIS — M50.20 OTHER CERVICAL DISC DISPLACEMENT, UNSPECIFIED CERVICAL REGION: ICD-10-CM

## 2020-02-10 DIAGNOSIS — M54.12 RADICULOPATHY, CERVICAL REGION: ICD-10-CM

## 2020-02-10 DIAGNOSIS — Z29.9 ENCOUNTER FOR PROPHYLACTIC MEASURES, UNSPECIFIED: ICD-10-CM

## 2020-02-10 LAB
ANION GAP SERPL CALC-SCNC: 12 MMOL/L — SIGNIFICANT CHANGE UP (ref 5–17)
BLD GP AB SCN SERPL QL: NEGATIVE — SIGNIFICANT CHANGE UP
BUN SERPL-MCNC: 25 MG/DL — HIGH (ref 7–23)
CALCIUM SERPL-MCNC: 10.4 MG/DL — SIGNIFICANT CHANGE UP (ref 8.4–10.5)
CHLORIDE SERPL-SCNC: 103 MMOL/L — SIGNIFICANT CHANGE UP (ref 96–108)
CO2 SERPL-SCNC: 24 MMOL/L — SIGNIFICANT CHANGE UP (ref 22–31)
CREAT SERPL-MCNC: 0.8 MG/DL — SIGNIFICANT CHANGE UP (ref 0.5–1.3)
GLUCOSE SERPL-MCNC: 93 MG/DL — SIGNIFICANT CHANGE UP (ref 70–99)
HCT VFR BLD CALC: 44.7 % — SIGNIFICANT CHANGE UP (ref 34.5–45)
HGB BLD-MCNC: 14.2 G/DL — SIGNIFICANT CHANGE UP (ref 11.5–15.5)
MCHC RBC-ENTMCNC: 29.9 PG — SIGNIFICANT CHANGE UP (ref 27–34)
MCHC RBC-ENTMCNC: 31.8 GM/DL — LOW (ref 32–36)
MCV RBC AUTO: 94.1 FL — SIGNIFICANT CHANGE UP (ref 80–100)
MRSA PCR RESULT.: SIGNIFICANT CHANGE UP
NRBC # BLD: 0 /100 WBCS — SIGNIFICANT CHANGE UP (ref 0–0)
PLATELET # BLD AUTO: 218 K/UL — SIGNIFICANT CHANGE UP (ref 150–400)
POTASSIUM SERPL-MCNC: 3.7 MMOL/L — SIGNIFICANT CHANGE UP (ref 3.5–5.3)
POTASSIUM SERPL-SCNC: 3.7 MMOL/L — SIGNIFICANT CHANGE UP (ref 3.5–5.3)
RBC # BLD: 4.75 M/UL — SIGNIFICANT CHANGE UP (ref 3.8–5.2)
RBC # FLD: 14 % — SIGNIFICANT CHANGE UP (ref 10.3–14.5)
RH IG SCN BLD-IMP: POSITIVE — SIGNIFICANT CHANGE UP
S AUREUS DNA NOSE QL NAA+PROBE: DETECTED
SODIUM SERPL-SCNC: 139 MMOL/L — SIGNIFICANT CHANGE UP (ref 135–145)
WBC # BLD: 7.01 K/UL — SIGNIFICANT CHANGE UP (ref 3.8–10.5)
WBC # FLD AUTO: 7.01 K/UL — SIGNIFICANT CHANGE UP (ref 3.8–10.5)

## 2020-02-10 RX ORDER — VANCOMYCIN HCL 1 G
1000 VIAL (EA) INTRAVENOUS ONCE
Refills: 0 | Status: DISCONTINUED | OUTPATIENT
Start: 2020-02-11 | End: 2020-02-12

## 2020-02-10 NOTE — H&P PST ADULT - NSICDXPASTMEDICALHX_GEN_ALL_CORE_FT
PAST MEDICAL HISTORY:  Cervical disc displacement     Cervical radiculopathy     Cervical spinal stenosis     Eosinophilic asthma PAST MEDICAL HISTORY:  Cervical disc displacement     Cervical radiculopathy     Cervical spinal stenosis     Eosinophilic asthma well-controlled on medication, no h/o hospitalizations or recent exacerbation    HLD (hyperlipidemia)     Panic attacks

## 2020-02-10 NOTE — H&P PST ADULT - NSANTHOSAYNRD_GEN_A_CORE
No. SHAINA screening performed.  STOP BANG Legend: 0-2 = LOW Risk; 3-4 = INTERMEDIATE Risk; 5-8 = HIGH Risk

## 2020-02-10 NOTE — H&P PST ADULT - HISTORY OF PRESENT ILLNESS
68 yo female with h/o HLD, eosinophilic asthma (controlled on medications), anxiety and cervical disc displacement with radiculopathy is scheduled for C3-C4 ACDF on 2/11/2020.

## 2020-02-10 NOTE — H&P PST ADULT - ASSESSMENT
1.	Cervical Radiculopathy  CAPRINI VTE 2.0 SCORE [CLOT updated 2019]    AGE RELATED RISK FACTORS                                                       MOBILITY RELATED FACTORS  [ ] Age 41-60 years                                            (1 Point)                    [ ] Bed rest                                                        (1 Point)  [ x] Age: 61-74 years                                           (2 Points)                  [ ] Plaster cast                                                   (2 Points)  [ ] Age= 75 years                                              (3 Points)                    [ ] Bed bound for more than 72 hours                 (2 Points)    DISEASE RELATED RISK FACTORS                                               GENDER SPECIFIC FACTORS  [ ] Edema in the lower extremities                       (1 Point)              [ ] Pregnancy                                                     (1 Point)  [ ] Varicose veins                                               (1 Point)                     [ ] Post-partum < 6 weeks                                   (1 Point)             [ ] BMI > 25 Kg/m2                                            (1 Point)                     [ ] Hormonal therapy  or oral contraception          (1 Point)                 [ ] Sepsis (in the previous month)                        (1 Point)               [ ] History of pregnancy complications                 (1 point)  [ ] Pneumonia or serious lung disease                                               [ ] Unexplained or recurrent                     (1 Point)           (in the previous month)                               (1 Point)  [ ] Abnormal pulmonary function test                     (1 Point)                 SURGERY RELATED RISK FACTORS  [ ] Acute myocardial infarction                              (1 Point)               [ ]  Section                                             (1 Point)  [ ] Congestive heart failure (in the previous month)  (1 Point)      [ ] Minor surgery                                                  (1 Point)   [ ] Inflammatory bowel disease                             (1 Point)               [ ] Arthroscopic surgery                                        (2 Points)  [ ] Central venous access                                      (2 Points)                [x ] General surgery lasting more than 45 minutes (2 points)  [ ] Malignancy- Present or previous                   (2 Points)                [ ] Elective arthroplasty                                         (5 points)    [ ] Stroke (in the previous month)                          (5 Points)                                                                                                                                                           HEMATOLOGY RELATED FACTORS                                                 TRAUMA RELATED RISK FACTORS  [ ] Prior episodes of VTE                                     (3 Points)                [ ] Fracture of the hip, pelvis, or leg                       (5 Points)  [ ] Positive family history for VTE                         (3 Points)             [ ] Acute spinal cord injury (in the previous month)  (5 Points)  [ ] Prothrombin 06024 A                                     (3 Points)               [ ] Paralysis  (less than 1 month)                             (5 Points)  [ ] Factor V Leiden                                             (3 Points)                  [ ] Multiple Trauma within 1 month                        (5 Points)  [ ] Lupus anticoagulants                                     (3 Points)                                                           [ ] Anticardiolipin antibodies                               (3 Points)                                                       [ ] High homocysteine in the blood                      (3 Points)                                             [ ] Other congenital or acquired thrombophilia      (3 Points)                                                [ ] Heparin induced thrombocytopenia                  (3 Points)                                     Total Score [    4      ]

## 2020-02-10 NOTE — H&P PST ADULT - ACTIVITY
ADLs, light to moderate housework; activity currently omewhat limited by left hand paresthesias and right leg pain/gait disturbance

## 2020-02-10 NOTE — H&P PST ADULT - NSICDXPROBLEM_GEN_ALL_CORE_FT
PROBLEM DIAGNOSES  Problem: Cervical radiculopathy  Assessment and Plan: C3-C4 ACDF    Problem: Need for prophylactic measure  Assessment and Plan: The Caprini score indicates this patient is at risk for a VTE event (score 3-5).  Most surgical patients in this group would benefit from pharmacologic prophylaxis.  The surgical team will determine the balance between VTE risk and bleeding risk

## 2020-02-11 ENCOUNTER — INPATIENT (INPATIENT)
Facility: HOSPITAL | Age: 70
LOS: 0 days | Discharge: HOME | DRG: 472 | End: 2020-02-12
Attending: NEUROLOGICAL SURGERY | Admitting: NEUROLOGICAL SURGERY
Payer: COMMERCIAL

## 2020-02-11 VITALS
TEMPERATURE: 98 F | HEIGHT: 64.5 IN | HEART RATE: 81 BPM | SYSTOLIC BLOOD PRESSURE: 127 MMHG | DIASTOLIC BLOOD PRESSURE: 82 MMHG | WEIGHT: 143.96 LBS | OXYGEN SATURATION: 97 % | RESPIRATION RATE: 17 BRPM

## 2020-02-11 DIAGNOSIS — M50.20 OTHER CERVICAL DISC DISPLACEMENT, UNSPECIFIED CERVICAL REGION: ICD-10-CM

## 2020-02-11 LAB
ANION GAP SERPL CALC-SCNC: 16 MMOL/L — SIGNIFICANT CHANGE UP (ref 5–17)
BUN SERPL-MCNC: 21 MG/DL — SIGNIFICANT CHANGE UP (ref 7–23)
CALCIUM SERPL-MCNC: 9.7 MG/DL — SIGNIFICANT CHANGE UP (ref 8.4–10.5)
CHLORIDE SERPL-SCNC: 103 MMOL/L — SIGNIFICANT CHANGE UP (ref 96–108)
CO2 SERPL-SCNC: 20 MMOL/L — LOW (ref 22–31)
CREAT SERPL-MCNC: 0.66 MG/DL — SIGNIFICANT CHANGE UP (ref 0.5–1.3)
GLUCOSE SERPL-MCNC: 138 MG/DL — HIGH (ref 70–99)
HCT VFR BLD CALC: 41.9 % — SIGNIFICANT CHANGE UP (ref 34.5–45)
HGB BLD-MCNC: 13.9 G/DL — SIGNIFICANT CHANGE UP (ref 11.5–15.5)
MCHC RBC-ENTMCNC: 30.6 PG — SIGNIFICANT CHANGE UP (ref 27–34)
MCHC RBC-ENTMCNC: 33.2 GM/DL — SIGNIFICANT CHANGE UP (ref 32–36)
MCV RBC AUTO: 92.3 FL — SIGNIFICANT CHANGE UP (ref 80–100)
NRBC # BLD: 0 /100 WBCS — SIGNIFICANT CHANGE UP (ref 0–0)
PLATELET # BLD AUTO: 199 K/UL — SIGNIFICANT CHANGE UP (ref 150–400)
POTASSIUM SERPL-MCNC: 3.7 MMOL/L — SIGNIFICANT CHANGE UP (ref 3.5–5.3)
POTASSIUM SERPL-SCNC: 3.7 MMOL/L — SIGNIFICANT CHANGE UP (ref 3.5–5.3)
RBC # BLD: 4.54 M/UL — SIGNIFICANT CHANGE UP (ref 3.8–5.2)
RBC # FLD: 13.6 % — SIGNIFICANT CHANGE UP (ref 10.3–14.5)
RH IG SCN BLD-IMP: POSITIVE — SIGNIFICANT CHANGE UP
SODIUM SERPL-SCNC: 139 MMOL/L — SIGNIFICANT CHANGE UP (ref 135–145)
WBC # BLD: 10.65 K/UL — HIGH (ref 3.8–10.5)
WBC # FLD AUTO: 10.65 K/UL — HIGH (ref 3.8–10.5)

## 2020-02-11 RX ORDER — SERTRALINE 25 MG/1
75 TABLET, FILM COATED ORAL DAILY
Refills: 0 | Status: DISCONTINUED | OUTPATIENT
Start: 2020-02-11 | End: 2020-02-12

## 2020-02-11 RX ORDER — HYDROMORPHONE HYDROCHLORIDE 2 MG/ML
0.5 INJECTION INTRAMUSCULAR; INTRAVENOUS; SUBCUTANEOUS
Refills: 0 | Status: DISCONTINUED | OUTPATIENT
Start: 2020-02-11 | End: 2020-02-12

## 2020-02-11 RX ORDER — ATORVASTATIN CALCIUM 80 MG/1
20 TABLET, FILM COATED ORAL AT BEDTIME
Refills: 0 | Status: DISCONTINUED | OUTPATIENT
Start: 2020-02-11 | End: 2020-02-12

## 2020-02-11 RX ORDER — TIOTROPIUM BROMIDE 18 UG/1
1 CAPSULE ORAL; RESPIRATORY (INHALATION) DAILY
Refills: 0 | Status: DISCONTINUED | OUTPATIENT
Start: 2020-02-11 | End: 2020-02-12

## 2020-02-11 RX ORDER — LORATADINE 10 MG/1
10 TABLET ORAL DAILY
Refills: 0 | Status: DISCONTINUED | OUTPATIENT
Start: 2020-02-11 | End: 2020-02-12

## 2020-02-11 RX ORDER — LIDOCAINE HCL 20 MG/ML
0.2 VIAL (ML) INJECTION ONCE
Refills: 0 | Status: DISCONTINUED | OUTPATIENT
Start: 2020-02-11 | End: 2020-02-11

## 2020-02-11 RX ORDER — SENNA PLUS 8.6 MG/1
2 TABLET ORAL AT BEDTIME
Refills: 0 | Status: DISCONTINUED | OUTPATIENT
Start: 2020-02-11 | End: 2020-02-12

## 2020-02-11 RX ORDER — ONDANSETRON 8 MG/1
4 TABLET, FILM COATED ORAL ONCE
Refills: 0 | Status: COMPLETED | OUTPATIENT
Start: 2020-02-11 | End: 2020-02-12

## 2020-02-11 RX ORDER — OXYCODONE HYDROCHLORIDE 5 MG/1
5 TABLET ORAL EVERY 6 HOURS
Refills: 0 | Status: DISCONTINUED | OUTPATIENT
Start: 2020-02-11 | End: 2020-02-12

## 2020-02-11 RX ORDER — BENZOCAINE AND MENTHOL 5; 1 G/100ML; G/100ML
1 LIQUID ORAL THREE TIMES A DAY
Refills: 0 | Status: DISCONTINUED | OUTPATIENT
Start: 2020-02-11 | End: 2020-02-12

## 2020-02-11 RX ORDER — MONTELUKAST 4 MG/1
10 TABLET, CHEWABLE ORAL DAILY
Refills: 0 | Status: DISCONTINUED | OUTPATIENT
Start: 2020-02-11 | End: 2020-02-12

## 2020-02-11 RX ORDER — SODIUM CHLORIDE 9 MG/ML
3 INJECTION INTRAMUSCULAR; INTRAVENOUS; SUBCUTANEOUS EVERY 8 HOURS
Refills: 0 | Status: DISCONTINUED | OUTPATIENT
Start: 2020-02-11 | End: 2020-02-11

## 2020-02-11 RX ORDER — SERTRALINE 25 MG/1
75 TABLET, FILM COATED ORAL DAILY
Refills: 0 | Status: DISCONTINUED | OUTPATIENT
Start: 2020-02-11 | End: 2020-02-11

## 2020-02-11 RX ORDER — DEXAMETHASONE 0.5 MG/5ML
4 ELIXIR ORAL EVERY 6 HOURS
Refills: 0 | Status: DISCONTINUED | OUTPATIENT
Start: 2020-02-11 | End: 2020-02-12

## 2020-02-11 RX ORDER — CHLORHEXIDINE GLUCONATE 213 G/1000ML
1 SOLUTION TOPICAL ONCE
Refills: 0 | Status: COMPLETED | OUTPATIENT
Start: 2020-02-11 | End: 2020-02-11

## 2020-02-11 RX ORDER — ONDANSETRON 8 MG/1
4 TABLET, FILM COATED ORAL EVERY 6 HOURS
Refills: 0 | Status: DISCONTINUED | OUTPATIENT
Start: 2020-02-11 | End: 2020-02-12

## 2020-02-11 RX ORDER — ACETAMINOPHEN 500 MG
325 TABLET ORAL EVERY 4 HOURS
Refills: 0 | Status: DISCONTINUED | OUTPATIENT
Start: 2020-02-11 | End: 2020-02-12

## 2020-02-11 RX ORDER — SODIUM CHLORIDE 9 MG/ML
1000 INJECTION INTRAMUSCULAR; INTRAVENOUS; SUBCUTANEOUS
Refills: 0 | Status: DISCONTINUED | OUTPATIENT
Start: 2020-02-11 | End: 2020-02-12

## 2020-02-11 RX ORDER — OXYCODONE HYDROCHLORIDE 5 MG/1
10 TABLET ORAL EVERY 6 HOURS
Refills: 0 | Status: DISCONTINUED | OUTPATIENT
Start: 2020-02-11 | End: 2020-02-12

## 2020-02-11 RX ORDER — BUDESONIDE AND FORMOTEROL FUMARATE DIHYDRATE 160; 4.5 UG/1; UG/1
2 AEROSOL RESPIRATORY (INHALATION)
Refills: 0 | Status: DISCONTINUED | OUTPATIENT
Start: 2020-02-11 | End: 2020-02-12

## 2020-02-11 RX ADMIN — BENZOCAINE AND MENTHOL 1 LOZENGE: 5; 1 LIQUID ORAL at 22:56

## 2020-02-11 RX ADMIN — SODIUM CHLORIDE 3 MILLILITER(S): 9 INJECTION INTRAMUSCULAR; INTRAVENOUS; SUBCUTANEOUS at 16:06

## 2020-02-11 RX ADMIN — Medication 4 MILLIGRAM(S): at 22:59

## 2020-02-11 RX ADMIN — ONDANSETRON 4 MILLIGRAM(S): 8 TABLET, FILM COATED ORAL at 21:30

## 2020-02-11 RX ADMIN — CHLORHEXIDINE GLUCONATE 1 APPLICATION(S): 213 SOLUTION TOPICAL at 16:07

## 2020-02-11 NOTE — PRE-OP CHECKLIST - MUPIRONCIN COMMENTS
pt tested positive for MSSA 1 day prior to surgery, no treatment received, will discuss with MD Purcell

## 2020-02-12 ENCOUNTER — TRANSCRIPTION ENCOUNTER (OUTPATIENT)
Age: 70
End: 2020-02-12

## 2020-02-12 VITALS
RESPIRATION RATE: 18 BRPM | OXYGEN SATURATION: 97 % | SYSTOLIC BLOOD PRESSURE: 110 MMHG | TEMPERATURE: 98 F | HEART RATE: 82 BPM | DIASTOLIC BLOOD PRESSURE: 54 MMHG

## 2020-02-12 PROCEDURE — 80048 BASIC METABOLIC PNL TOTAL CA: CPT

## 2020-02-12 PROCEDURE — 86900 BLOOD TYPING SEROLOGIC ABO: CPT

## 2020-02-12 PROCEDURE — 76000 FLUOROSCOPY <1 HR PHYS/QHP: CPT

## 2020-02-12 PROCEDURE — 87640 STAPH A DNA AMP PROBE: CPT

## 2020-02-12 PROCEDURE — 20931 SP BONE ALGRFT STRUCT ADD-ON: CPT

## 2020-02-12 PROCEDURE — C1889: CPT

## 2020-02-12 PROCEDURE — 99222 1ST HOSP IP/OBS MODERATE 55: CPT

## 2020-02-12 PROCEDURE — 85027 COMPLETE CBC AUTOMATED: CPT

## 2020-02-12 PROCEDURE — C1769: CPT

## 2020-02-12 PROCEDURE — 22551 ARTHRD ANT NTRBDY CERVICAL: CPT

## 2020-02-12 PROCEDURE — G0463: CPT

## 2020-02-12 PROCEDURE — 97161 PT EVAL LOW COMPLEX 20 MIN: CPT

## 2020-02-12 PROCEDURE — 86901 BLOOD TYPING SEROLOGIC RH(D): CPT

## 2020-02-12 PROCEDURE — 86850 RBC ANTIBODY SCREEN: CPT

## 2020-02-12 PROCEDURE — 87641 MR-STAPH DNA AMP PROBE: CPT

## 2020-02-12 PROCEDURE — 99232 SBSQ HOSP IP/OBS MODERATE 35: CPT

## 2020-02-12 PROCEDURE — C1713: CPT

## 2020-02-12 RX ORDER — ONDANSETRON 8 MG/1
1 TABLET, FILM COATED ORAL
Qty: 10 | Refills: 0
Start: 2020-02-12

## 2020-02-12 RX ORDER — ACETAMINOPHEN 500 MG
1000 TABLET ORAL ONCE
Refills: 0 | Status: COMPLETED | OUTPATIENT
Start: 2020-02-12 | End: 2020-02-12

## 2020-02-12 RX ORDER — SENNA PLUS 8.6 MG/1
2 TABLET ORAL
Qty: 0 | Refills: 0 | DISCHARGE
Start: 2020-02-12

## 2020-02-12 RX ORDER — ACETAMINOPHEN 500 MG
1 TABLET ORAL
Qty: 0 | Refills: 0 | DISCHARGE
Start: 2020-02-12

## 2020-02-12 RX ORDER — METOCLOPRAMIDE HCL 10 MG
10 TABLET ORAL ONCE
Refills: 0 | Status: COMPLETED | OUTPATIENT
Start: 2020-02-12 | End: 2020-02-12

## 2020-02-12 RX ORDER — ENOXAPARIN SODIUM 100 MG/ML
40 INJECTION SUBCUTANEOUS AT BEDTIME
Refills: 0 | Status: DISCONTINUED | OUTPATIENT
Start: 2020-02-12 | End: 2020-02-12

## 2020-02-12 RX ORDER — POTASSIUM CHLORIDE 20 MEQ
40 PACKET (EA) ORAL ONCE
Refills: 0 | Status: DISCONTINUED | OUTPATIENT
Start: 2020-02-12 | End: 2020-02-12

## 2020-02-12 RX ORDER — POTASSIUM CHLORIDE 20 MEQ
10 PACKET (EA) ORAL
Refills: 0 | Status: COMPLETED | OUTPATIENT
Start: 2020-02-12 | End: 2020-02-12

## 2020-02-12 RX ORDER — FLUTICASONE PROPIONATE AND SALMETEROL 50; 250 UG/1; UG/1
1 POWDER ORAL; RESPIRATORY (INHALATION)
Refills: 0 | Status: DISCONTINUED | OUTPATIENT
Start: 2020-02-12 | End: 2020-02-12

## 2020-02-12 RX ADMIN — Medication 100 MILLIEQUIVALENT(S): at 04:19

## 2020-02-12 RX ADMIN — ONDANSETRON 4 MILLIGRAM(S): 8 TABLET, FILM COATED ORAL at 09:01

## 2020-02-12 RX ADMIN — Medication 400 MILLIGRAM(S): at 01:15

## 2020-02-12 RX ADMIN — Medication 4 MILLIGRAM(S): at 10:40

## 2020-02-12 RX ADMIN — Medication 1000 MILLIGRAM(S): at 01:45

## 2020-02-12 RX ADMIN — SODIUM CHLORIDE 75 MILLILITER(S): 9 INJECTION INTRAMUSCULAR; INTRAVENOUS; SUBCUTANEOUS at 00:06

## 2020-02-12 RX ADMIN — Medication 10 MILLIGRAM(S): at 00:50

## 2020-02-12 RX ADMIN — ONDANSETRON 4 MILLIGRAM(S): 8 TABLET, FILM COATED ORAL at 00:26

## 2020-02-12 RX ADMIN — Medication 100 MILLIGRAM(S): at 10:39

## 2020-02-12 RX ADMIN — ONDANSETRON 4 MILLIGRAM(S): 8 TABLET, FILM COATED ORAL at 05:24

## 2020-02-12 RX ADMIN — Medication 4 MILLIGRAM(S): at 05:11

## 2020-02-12 RX ADMIN — Medication 100 MILLIGRAM(S): at 02:00

## 2020-02-12 RX ADMIN — Medication 100 MILLIEQUIVALENT(S): at 03:12

## 2020-02-12 RX ADMIN — Medication 100 MILLIEQUIVALENT(S): at 02:19

## 2020-02-12 NOTE — PROGRESS NOTE ADULT - ASSESSMENT
68 yo female with h/o HLD, eosinophilic asthma (controlled on medications), anxiety and cervical disc displacement with radiculopathy is scheduled for C3-C4 ACDF on 2/11/2020.     PROCEDURE: 2/11 s/p C3-4 Anterior cervical discectomy and fusion (ACDF)     POD#1    PLAN:  Neuro:   - awaiting floor bed  - tolerating liquid diet  - nausea- continue zofran prn  - post op pain/edema- on decadron 4q6 x 4 doses  - pain control- oxycodone prn  - depression- on sertraline, ativan prn anxiety  Respiratory:   - asthma- continue advair and spiriva  CV:  - vital stable   DVT ppx:   - venodynes,   PT/OT:   TBD      Assessment:  Please Check When Present   []  GCS  E   V  M     Heart Failure: []Acute, [] acute on chronic , []chronic  Heart Failure:  [] Diastolic (HFpEF), [] Systolic (HFrEF), []Combined (HFpEF and HFrEF), [] RHF, [] Pulm HTN, [] Other    [] MARTINE, [] ATN, [] AIN, [] other  [] CKD1, [] CKD2, [] CKD 3, [] CKD 4, [] CKD 5, []ESRD    Encephalopathy: [] Metabolic, [] Hepatic, [] toxic, [] Neurological, [] Other    Abnormal Nurtitional Status: [] malnurtition (see nutrition note), [ ]underweight: BMI < 19, [] morbid obesity: BMI >40, [] Cachexia    [] Sepsis  [] hypovolemic shock,[] cardiogenic shock, [] hemorrhagic shock, [] neuogenic shock  [] Acute Respiratory Failure  []Cerebral edema, [] Brain compression/ herniation,   [] Functional quadriplegia  [] Acute blood loss anemia    Spectralink # [id number]

## 2020-02-12 NOTE — CONSULT NOTE ADULT - ASSESSMENT
69F PMH cervical spine stenosis, HLD, eosinophilic asthma, anxiety, and allergic rhinitis presents for elective C3-C4 ACDF on 2/11/2020. No post-op complications. Minimal bleeding intra-op. Surgical site appears clean, dry, intact. No dyspnea, wheezing, or cough currently and lungs are clear bilaterally.    - Continue fluticasone-salmeterol 250-50 bid (rinse after use)  - Continue Spiriva respimat 2 inhalations daily  - Incentive spirometry  - Ambulate as tolerated  - Continue montelukast and desloratadine  - SCD's for DVT ppx  - Discussed with patient at bedside, no contraindications for discharge home from PACU today. She will follow-up with Dr. Crystal rodas

## 2020-02-12 NOTE — CHART NOTE - NSCHARTNOTEFT_GEN_A_CORE
CAPRINI SCORE [CLOT] Score on Admission for     AGE RELATED RISK FACTORS                                                       MOBILITY RELATED FACTORS  [ ] Age 41-60 years                                            (1 Point)                  [ ] Bed rest                                                        (1 Point)  [x ] Age: 61-74 years                                           (2 Points)                 [ ] Plaster cast                                                   (2 Points)  [ ] Age= 75 years                                              (3 Points)                 [ ] Bed bound for more than 72 hours                 (2 Points)    DISEASE RELATED RISK FACTORS                                               GENDER SPECIFIC FACTORS  [ ] Edema in the lower extremities                       (1 Point)                  [ ] Pregnancy                                                     (1 Point)  [ ] Varicose veins                                               (1 Point)                  [ ] Post-partum < 6 weeks                                   (1 Point)             [ ] BMI > 25 Kg/m2                                            (1 Point)                  [ ] Hormonal therapy  or oral contraception          (1 Point)                 [ ] Sepsis (in the previous month)                        (1 Point)                  [ ] History of pregnancy complications                 (1 point)  [ ] Pneumonia or serious lung disease                                               [ ] Unexplained or recurrent                     (1 Point)           (in the previous month)                               (1 Point)  [ ] Abnormal pulmonary function test                     (1 Point)                 SURGERY RELATED RISK FACTORS (include planned surgeries)  [ ] Acute myocardial infarction                              (1 Point)                 [ ]  Section                                             (1 Point)  [ ] Congestive heart failure (in the previous month)  (1 Point)         [ ] Minor surgery                                                  (1 Point)   [ ] Inflammatory bowel disease                             (1 Point)                 [ ] Arthroscopic surgery                                        (2 Points)  [ ] Central venous access                                      (2 Points)                [x ] General surgery lasting more than 45 minutes   (2 Points)       [ ] Stroke (in the previous month)                          (5 Points)               [ ] Elective arthroplasty                                         (5 Points)            [ ] current or past malignancy                              (2 Points)                                                                                                       HEMATOLOGY RELATED FACTORS                                                 TRAUMA RELATED RISK FACTORS  [ ] Prior episodes of VTE                                     (3 Points)                [ ] Fracture of the hip, pelvis, or leg                       (5 Points)  [ ] Positive family history for VTE                         (3 Points)                 [ ] Acute spinal cord injury (in the previous month)  (5 Points)  [ ] Prothrombin 61326 A                                     (3 Points)                 [ ] Paralysis  (less than 1 month)                             (5 Points)  [ ] Factor V Leiden                                             (3 Points)                  [ ] Multiple Trauma within 1 month                        (5 Points)  [ ] Lupus anticoagulants                                     (3 Points)                                                           [ ] Anticardiolipin antibodies                               (3 Points)                                                       [ ] High homocysteine in the blood                      (3 Points)                                             [ ] Other congenital or acquired thrombophilia      (3 Points)                                                [ ] Heparin induced thrombocytopenia                  (3 Points)                                          Total Score [      4    ]    Risk:  Very low 0   Low 1 to 2   Moderate 3 to 4   High =5       VTE Prophylasix Recommednations:  [x ] mechanical pneumatic compression devices                                      [ ] contraindicated: _____________________  [ ] chemo prophylasix                                                                                   [x ] contraindicated ________post op_____________    **** HIGH LIKELIHOOD DVT PRESENT ON ADMISSION  [x ] (please order LE dopplers within 24 hours of admission)

## 2020-02-12 NOTE — DISCHARGE NOTE PROVIDER - HOSPITAL COURSE
70 yo female with h/o HLD, eosinophilic asthma (controlled on medications), anxiety and cervical disc displacement with radiculopathy is scheduled for C3-C4 ACDF on 2/11/2020.         On 2/11/20 patient underwent C3-4 Anterior cervical discectomy and fusion (ACDF). Pt tolerated well without complications. Patient on decadron for post op pain/edema. Patient was evaluated by Physical Therapy ad outpatient PT was recommended. On day of discharge patietn is medically and neurologically stable. 70 yo female with h/o HLD, eosinophilic asthma (controlled on medications), anxiety and cervical disc displacement with radiculopathy is scheduled for C3-C4 ACDF on 2/11/2020.         On 2/11/20 patient underwent C3-4 Anterior cervical discectomy and fusion (ACDF). Pt tolerated well without complications. Patient on decadron for post op pain/edema. Patient was evaluated by Physical Therapy and outpatient PT was recommended. On day of discharge patient is medically and neurologically stable.

## 2020-02-12 NOTE — DISCHARGE NOTE PROVIDER - NSDCCPCAREPLAN_GEN_ALL_CORE_FT
PRINCIPAL DISCHARGE DIAGNOSIS  Diagnosis: Cervical radiculopathy  Assessment and Plan of Treatment: 2/11/20 C3-4 ACDF, take medrol dose pack as directed on package      SECONDARY DISCHARGE DIAGNOSES  Diagnosis: Asthma  Assessment and Plan of Treatment: Continue current medication

## 2020-02-12 NOTE — PROGRESS NOTE ADULT - SUBJECTIVE AND OBJECTIVE BOX
SUMMARY:   70 yo female with h/o HLD, eosinophilic asthma (controlled on medications), anxiety and cervical disc displacement with radiculopathy is scheduled for C3-C4 ACDF on 2/11/2020.    OVERNIGHT EVENTS: PACU s/p C3-4 ACDF    ADMISSION SCORES:   GCS: 15 HH: MF: NIHSS: ICH Score:    REVIEW OF SYSTEMS: REVIEW OF SYSTEMS: [] Unable to Assess due to neurologic exam   [ x] All ROS addressed below are non-contributory, except: post op pain  Neuro: [ ] Headache [ ] Back pain [ ] Numbness [ ] Weakness [ ] Ataxia [ ] Dizziness [ ] Aphasia [ ] Dysarthria [ ] Visual disturbance  Resp: [ ] Shortness of breath/dyspnea [ ] Orthopnea [ ] Cough  CV: [ ] Chest pain [ ] Palpitation [ ] Lightheadedness [ ] Syncope  Renal: [ ] Thirst [ ] Edema  GI: [ ] Nausea [ ] Emesis [ ] Abdominal pain [ ] Constipation [ ] Diarrhea  Hem: [ ] Hematemesis [ ] Bright red blood per rectum  ID: [ ] Fever [ ] Chills [ ] Dysuria  ENT: [ ] Rhinorrhea    VITALS: [x] Reviewed    IMAGING/DATA: [x] Reviewed    IVF FLUIDS/MEDICATIONS: [x] Reviewed    ALLERGIES: Allergies    penicillin (Hives)  Pollen/Dust/Grass/Trees (Short breath; Rhinorrhea)    Intolerances    DEVICES:   [] Restraints [x] PIVs [] ET tube [] central line [] PICC [x] arterial line [] pradhan [] NGT/OGT [] EVD [] LD [] BENITO/HMV [] LiCOX [] ICP monitor [] Trach [] PEG [] Chest Tube [] other:    EXAMINATION:  General: No acute distress  HEENT: Anicteric sclerae  Cardiac: [id number]  Lungs: Clear, breathing comfortably  Abdomen: Soft, non-tender, +BS  Extremities: No c/c/e  Skin/Incision Site: Clean, dry and intact, no hematoma  Neurologic: Awake, alert, speaking in full sentences, fully oriented, follows commands, PERRL, VFFtc, EOMI, face symmetric, tongue midline, no drift, HG 4+/5 b/l otherwise R full strength, L side 4+/5

## 2020-02-12 NOTE — PHYSICAL THERAPY INITIAL EVALUATION ADULT - ADDITIONAL COMMENTS
pt lives in private home with family. 3 steps to enter +HR. Prior to admission, pt was I with all functional mobility and ADLs without AD. Family is able to assist.

## 2020-02-12 NOTE — DISCHARGE NOTE PROVIDER - NSDCACTIVITY_GEN_ALL_CORE
Walking - Outdoors allowed/Stairs allowed/No heavy lifting/straining/Walking - Indoors allowed/Do not drive or operate machinery/Showering allowed

## 2020-02-12 NOTE — CONSULT NOTE ADULT - SUBJECTIVE AND OBJECTIVE BOX
[facility] - Division of Pulmonary, Critical Care and Sleep Medicine   Please call [phone] between 8am-pm weekdays, [phone] after hours and weekends      CHIEF COMPLAINT: presents for elective ACDF    HPI:  69F PMH cervical spine stenosis, HLD, eosinophilic asthma, anxiety, and allergic rhinitis presents for elective C3-C4 ACDF on 2/11/2020. No current pain, minimal blood loss intra-op (25mL). Denies any fevers or chills. No chest pain or dyspnea. Denies any cough. Planning for discharge home today    PAST MEDICAL & SURGICAL HISTORY:  Panic attacks  HLD (hyperlipidemia)  Cervical spinal stenosis  Eosinophilic asthma: well-controlled on medication, no h/o hospitalizations or recent exacerbation  Cervical radiculopathy  Cervical disc displacement  No significant past surgical history      FAMILY HISTORY: reviewed, [place]      SOCIAL HISTORY:  Smoking: former social smoker as a teenager  No alcohol or illicit drug use    Allergies  penicillin (Hives)  Pollen/Dust/Grass/Trees (Short breath; Rhinorrhea)    HOME MEDICATIONS: sertraline, rosuvastatin, montelukast, desloratadine, Wixela 250-50 bid, Spiriva respimat 2 puffs daily    REVIEW OF SYSTEMS:  Constitutional: [ ] fevers [ ] chills [ ] weight loss [ ] weight gain  HEENT: [ ] dry eyes [ ] eye irritation [ ] postnasal drip [ ] nasal congestion  CV: [ ] chest pain [ ] orthopnea [ ] palpitations [ ] murmur  Resp: [ ] cough [ ] shortness of breath [ ] wheezing [ ] sputum [ ] hemoptysis  GI: [ ] nausea [ ] vomiting [ ] diarrhea [ ] constipation [ ] abd pain [ ] dysphagia   : [ ] dysuria [ ] nocturia [ ] hematuria [ ] increased urinary frequency  Musculoskeletal: [ ] myalgias [ ] arthralgias   Skin: [ ] rash [ ] itch  Neurological: [ ] headache [ ] dizziness [ ] syncope [ ] weakness [ ] numbness  Psychiatric: [ ] anxiety [ ] depression  Endocrine: [ ] diabetes [ ] thyroid problem  Hematologic/Lymphatic: [ ] anemia [ ] bleeding problem  Allergic/Immunologic: [ ] itchy eyes [ ] nasal discharge [ ] hives [ ] angioedema  [x ] All other systems negative  [ ] Unable to assess ROS because ________    OBJECTIVE:  ICU Vital Signs Last 24 Hrs  T(C): 36.8 (12 Feb 2020 12:00), Max: 36.8 (12 Feb 2020 12:00)  T(F): 98.2 (12 Feb 2020 12:00), Max: 98.2 (12 Feb 2020 12:00)  HR: 82 (12 Feb 2020 12:00) (75 - 95)  BP: 110/54 (12 Feb 2020 12:00) (110/54 - 156/86)  BP(mean): 105 (12 Feb 2020 08:00) (86 - 124)  ABP: 124/61 (12 Feb 2020 02:00) (124/61 - 168/79)  ABP(mean): 87 (12 Feb 2020 02:00) (87 - 119)  RR: 18 (12 Feb 2020 12:00) (12 - 18)  SpO2: 97% (12 Feb 2020 12:00) (96% - 100%)        02-11 @ 07:01  -  02-12 @ 07:00  --------------------------------------------------------  IN: 1275 mL / OUT: 1200 mL / NET: 75 mL    PHYSICAL EXAM:  General:  NAD  HEENT:  NC/AT  Lymph Nodes: No cervical or supraclavicular lymphadenopathy   Neck: Supple; +dressing over left lateral neck at surgical site; no noted bleeding  Respiratory:  CTA B/L, no wheezes, crackles or rhonchi  Cardiovascular:  RRR, no m/r/g  Abdomen: soft, NT/ND, +BS  Extremities: no clubbing, cyanosis or edema, warm  Skin: no rash  Neurological: AAOx3, no focal deficits  Psychiatry: not anxious, normal affect    HOSPITAL MEDICATIONS:  MEDICATIONS  (STANDING):  atorvastatin 20 milliGRAM(s) Oral at bedtime  dexAMETHasone  Injectable 4 milliGRAM(s) IV Push every 6 hours  enoxaparin Injectable 40 milliGRAM(s) SubCutaneous at bedtime  loratadine 10 milliGRAM(s) Oral daily  montelukast 10 milliGRAM(s) Oral daily  senna 2 Tablet(s) Oral at bedtime  sertraline 75 milliGRAM(s) Oral daily  sodium chloride 0.9%. 1000 milliLiter(s) (75 mL/Hr) IV Continuous <Continuous>  tiotropium 18 MICROgram(s) Capsule 1 Capsule(s) Inhalation daily  vancomycin  IVPB 1000 milliGRAM(s) IV Intermittent once    MEDICATIONS  (PRN):  acetaminophen   Tablet .. 325 milliGRAM(s) Oral every 4 hours PRN Temp greater or equal to 38C (100.4F), Mild Pain (1 - 3)  benzocaine 15 mG/menthol 3.6 mG (Sugar-Free) Lozenge 1 Lozenge Oral three times a day PRN Sore Throat  fluticasone propionate/ salmeterol 250-50 MICROgram(s) Diskus 1 Dose(s) Inhalation two times a day PRN patient's preference  LORazepam     Tablet 0.5 milliGRAM(s) Oral daily PRN Anxiety  ondansetron Injectable 4 milliGRAM(s) IV Push every 6 hours PRN Nausea  oxyCODONE    IR 5 milliGRAM(s) Oral every 6 hours PRN Moderate Pain (4 - 6)  oxyCODONE    IR 10 milliGRAM(s) Oral every 6 hours PRN Severe Pain (7 - 10)      LABS:                        13.9   10.65 )-----------( 199      ( 11 Feb 2020 22:57 )             41.9     Hgb Trend: 13.9<--, 14.2<--  02-11    139  |  103  |  21  ----------------------------<  138<H>  3.7   |  20<L>  |  0.66    Ca    9.7      11 Feb 2020 22:57      Creatinine Trend: 0.66<--, 0.80<--

## 2020-02-12 NOTE — PROGRESS NOTE ADULT - SUBJECTIVE AND OBJECTIVE BOX
Patient seen and examined at bedside.    --Anticoagulation--    T(C): 36.6 (02-12-20 @ 02:00), Max: 36.7 (02-11-20 @ 15:10)  HR: 80 (02-12-20 @ 03:00) (75 - 95)  BP: 124/65 (02-12-20 @ 03:00) (124/65 - 156/86)  RR: 14 (02-12-20 @ 03:00) (12 - 17)  SpO2: 97% (02-12-20 @ 03:00) (96% - 100%)  Wt(kg): --    Exam:  AOx3, FC, PERRL, EOMI, no facial   5/5 R, LUE 4/5 LLE 5/5, no drift  SILT  no clonus

## 2020-02-12 NOTE — DISCHARGE NOTE PROVIDER - CARE PROVIDER_API CALL
Davy Purcell)  Neurological Surgery  [address] [place] [address], [address]  [place] [address]  Phone: [phone]  Fax: [phone]  Follow Up Time:

## 2020-02-12 NOTE — PROGRESS NOTE ADULT - ASSESSMENT
POD#1 C3-4 ACDF    NEURO:  Pain control  Activity: [x] OOB as tolerated [] Bedrest [x] PT [x] OT [] PMNR    PULM:  Incentive spirometry, mobilize as tolerated    CV:  MAP>80, d/c a-line in AM    RENAL:  IVF until good PO intake    GI:  Diet: Dysphagia screen and then advance diet as tolerated  GI prophylaxis [x] not indicated PPI [] other:  Bowel regimen [] colace [x] senna [] other:    ENDO:   Goal euglycemia (-180)    HEME/ONC:  VTE prophylaxis: [x] SCDs [x] chemoprophylaxis start POD#1 [] hold chemoprophylaxis due to: [] high risk of DVT/PE on admission due to:    ID:  Thalia-op antibiotics    MISC:    SOCIAL/FAMILY:  [] awaiting [x] updated at bedside [] family meeting    CODE STATUS:  [x] Full Code [] DNR [] DNI [] Palliative/Comfort Care    DISPOSITION:  [] ICU [] Stroke Unit [x] Floor [] EMU [] RCU [] PCU    35minutes

## 2020-02-12 NOTE — PROGRESS NOTE ADULT - ATTENDING COMMENTS
Pt doing well. Left arm numbness and decreased dexterity improved. Ambulating independently.    Mild dysphagia.    PT consult    DC planning after PT clearance

## 2020-02-12 NOTE — DISCHARGE NOTE PROVIDER - NSDCFUADDINST_GEN_ALL_CORE_FT
Please remove dressing tomorrow.   Return to ER for fever, chills, nausea or vomiting, severe headache or pain not relieved with pain medication, sluggishness or change in mental status, any bleeding or drainage from wound,  or any weakness of extremities.   You may shower and wash your hair on post op day #4. No rubbing or scrubbing of incision. Keep incision clean and dry. Do not apply creams or lotions to incision.   No driving until cleared by your surgeon. Do not return to work until cleared by surgeon. Do not take advil, aleve or ibuprofen as they can cause bleeding.

## 2020-02-12 NOTE — DISCHARGE NOTE PROVIDER - NSDCMRMEDTOKEN_GEN_ALL_CORE_FT
desloratadine 5 mg oral tablet: 1 tab(s) orally once a day  LORazepam 0.5 mg oral tablet: 1 tab(s) orally once a day, As Needed  montelukast 10 mg oral tablet: 1 tab(s) orally once a day  rosuvastatin 5 mg oral tablet: 1 tab(s) orally once a day  sertraline: 75 milligram(s) orally once a day  Spiriva Respimat 10 ACT 2.5 mcg/inh inhalation aerosol: 2 puff(s) inhaled once a day  Wixela Inhub 250 mcg-50 mcg inhalation powder: 1 dose(s) inhaled 2 times a day acetaminophen 325 mg oral tablet: 1 tab(s) orally every 4 hours, As needed, Mild Pain (1 - 3)  desloratadine 5 mg oral tablet: 1 tab(s) orally once a day  LORazepam 0.5 mg oral tablet: 1 tab(s) orally once a day, As Needed  MethylPREDNISolone Dose Pack 4 mg oral tablet: Use as directed  montelukast 10 mg oral tablet: 1 tab(s) orally once a day  rosuvastatin 5 mg oral tablet: 1 tab(s) orally once a day  senna oral tablet: 2 tab(s) orally once a day (at bedtime)  sertraline: 75 milligram(s) orally once a day  Spiriva Respimat 10 ACT 2.5 mcg/inh inhalation aerosol: 2 puff(s) inhaled once a day  Wixela Inhub 250 mcg-50 mcg inhalation powder: 1 dose(s) inhaled 2 times a day  Zofran 4 mg oral tablet: 1 tab(s) orally every 8 hours, As Needed -for nausea

## 2020-02-12 NOTE — DISCHARGE NOTE PROVIDER - NSDCFUSCHEDAPPT_GEN_ALL_CORE_FT
TUNDE LIZARRAGA ; 04/22/2020 ; NPP Puled [address] [facility] TUNDE LIZARRAGA ; 04/22/2020 ; NPP P[facility] [address] [facility]

## 2020-02-12 NOTE — DISCHARGE NOTE NURSING/CASE MANAGEMENT/SOCIAL WORK - PATIENT PORTAL LINK FT
You can access the FollowMyHealth Patient Portal offered by [facility] by registering at the following website: [email or web address]. By joining [facility]’s FollowMyHealth portal, you will also be able to view your health information using other applications (apps) compatible with our system.

## 2020-02-12 NOTE — PHYSICAL THERAPY INITIAL EVALUATION ADULT - GAIT DEVIATIONS NOTED, PT EVAL
decreased velocity of limb motion/decreased humera/decreased weight-shifting ability/increased time in double stance

## 2020-02-12 NOTE — PROGRESS NOTE ADULT - SUBJECTIVE AND OBJECTIVE BOX
SUBJECTIVE: Pt seen and examined in PACU, daughter at bedside, pt reports throat pain, bilateral hand tingling and some"queasiness"    OVERNIGHT EVENTS: none    Vital Signs Last 24 Hrs  T(C): 36.5 (12 Feb 2020 08:00), Max: 36.7 (11 Feb 2020 15:10)  T(F): 97.7 (12 Feb 2020 08:00), Max: 98.1 (11 Feb 2020 15:10)  HR: 87 (12 Feb 2020 08:00) (75 - 95)  BP: 154/73 (12 Feb 2020 08:00) (124/65 - 156/86)  BP(mean): 105 (12 Feb 2020 08:00) (86 - 124)  RR: 18 (12 Feb 2020 08:00) (12 - 18)  SpO2: 98% (12 Feb 2020 08:00) (96% - 100%)    PHYSICAL EXAM:    General: No Acute Distress     Neurological: Awake, alert oriented to person, place and time, Following Commands,  Speech Fluent, Moving all extremities, b/l HG 4+/5, LUE 4+/5, No Drift, Sensation to Light Touch Intact    Pulmonary: Clear to Auscultation, No Rales, No Rhonchi, No Wheezes     Cardiovascular: S1, S2, Regular Rate and Rhythm     Gastrointestinal: Soft, Nontender, Nondistended     Incision: anterior neck dressing c/d/i    LABS:                        13.9   10.65 )-----------( 199      ( 11 Feb 2020 22:57 )             41.9    02-11    139  |  103  |  21  ----------------------------<  138<H>  3.7   |  20<L>  |  0.66    Ca    9.7      11 Feb 2020 22:57          02-11 @ 07:01  -  02-12 @ 07:00  --------------------------------------------------------  IN: 1275 mL / OUT: 1200 mL / NET: 75 mL    02-12 @ 07:01  -  02-12 @ 10:31  --------------------------------------------------------  IN: 75 mL / OUT: 0 mL / NET: 75 mL      DRAINS: none    MEDICATIONS:  Antibiotics:  clindamycin IVPB 900 milliGRAM(s) IV Intermittent every 8 hours  vancomycin  IVPB 1000 milliGRAM(s) IV Intermittent once    Neuro:  acetaminophen   Tablet .. 325 milliGRAM(s) Oral every 4 hours PRN Temp greater or equal to 38C (100.4F), Mild Pain (1 - 3)  LORazepam     Tablet 0.5 milliGRAM(s) Oral daily PRN Anxiety  ondansetron Injectable 4 milliGRAM(s) IV Push every 6 hours PRN Nausea  oxyCODONE    IR 5 milliGRAM(s) Oral every 6 hours PRN Moderate Pain (4 - 6)  oxyCODONE    IR 10 milliGRAM(s) Oral every 6 hours PRN Severe Pain (7 - 10)  sertraline 75 milliGRAM(s) Oral daily    Cardiac:    Pulm:  fluticasone propionate/ salmeterol 250-50 MICROgram(s) Diskus 1 Dose(s) Inhalation two times a day PRN patient's preference  loratadine 10 milliGRAM(s) Oral daily  montelukast 10 milliGRAM(s) Oral daily  tiotropium 18 MICROgram(s) Capsule 1 Capsule(s) Inhalation daily    GI/:  senna 2 Tablet(s) Oral at bedtime    Other:   atorvastatin 20 milliGRAM(s) Oral at bedtime  benzocaine 15 mG/menthol 3.6 mG (Sugar-Free) Lozenge 1 Lozenge Oral three times a day PRN Sore Throat  dexAMETHasone  Injectable 4 milliGRAM(s) IV Push every 6 hours  sodium chloride 0.9%. 1000 milliLiter(s) IV Continuous <Continuous>    DIET: [x] Regular [] CCD [] Renal [] Puree [] Dysphagia [] Tube Feeds:     IMAGING:

## 2020-02-12 NOTE — DISCHARGE NOTE PROVIDER - NSDCFUADDAPPT_GEN_ALL_CORE_FT
Please make an appointment for follow up with 1) Dr Purcell and 2) Your Primary Care Physician in 1 week

## 2020-02-22 ENCOUNTER — TRANSCRIPTION ENCOUNTER (OUTPATIENT)
Age: 70
End: 2020-02-22

## 2020-03-08 ENCOUNTER — RX RENEWAL (OUTPATIENT)
Age: 70
End: 2020-03-08

## 2020-04-22 ENCOUNTER — APPOINTMENT (OUTPATIENT)
Dept: PULMONOLOGY | Facility: CLINIC | Age: 70
End: 2020-04-22
Payer: MEDICARE

## 2020-04-22 PROCEDURE — 99214 OFFICE O/P EST MOD 30 MIN: CPT | Mod: 95

## 2020-04-22 RX ORDER — ONDANSETRON 4 MG/1
4 TABLET ORAL
Qty: 10 | Refills: 0 | Status: DISCONTINUED | COMMUNITY
Start: 2020-02-12

## 2020-04-22 RX ORDER — METHYLPREDNISOLONE 4 MG/1
4 TABLET ORAL
Qty: 21 | Refills: 0 | Status: DISCONTINUED | COMMUNITY
Start: 2020-02-12

## 2020-04-22 NOTE — REASON FOR VISIT
[Follow-Up] : a follow-up visit [FreeTextEntry1] : Video Telehealth - allergic rhinitis, elevated IgE, eosinophilic asthma, severe persistent asthma, and SOB

## 2020-04-22 NOTE — PHYSICAL EXAM
[Well Nourished] : well nourished [Normal Appearance] : normal appearance [No Acute Distress] : no acute distress [Well Groomed] : well groomed [No Deformities] : no deformities [Well Developed] : well developed [TextBox_2] : Appears Well.

## 2020-04-22 NOTE — HISTORY OF PRESENT ILLNESS
[Home] : at home, [unfilled] , at the time of the visit. [Medical Office: ([facility])___] : at the medical office located in  [Patient] : the patient [Self] : self [FreeTextEntry2] : TUNDE LIZARRAGA  [FreeTextEntry1] : Ms. Henson is a 69 year old female with a history of allergic rhinitis, elevated IgE, eosinophilic asthma, severe persistent asthma, and SOB video calls into the office today for a follow up visit. Her chief complaint recovering from her recent neck surgery. \par -she has been feeling well. \par -she needed cervical fusion and had a surgery performed. \par -has dysphasia. \par -denies palpitations. \par -hand feel better, but still feels some tinglings. \par -she states that she has loss some weight recently. \par -energy level is 7-8/10. \par -she is not waking much. \par -She reports that She is not using any new medication, vitamins, or supplements. \par \par -She denies any chest pain, chest pressure, diarrhea, constipation, dizziness, sour taste in the mouth, leg swelling, leg pain, itchy eyes, itchy ears, heartburn, reflux, myalgias or arthralgias.

## 2020-04-22 NOTE — ASSESSMENT
Called patient to review instructions and go over medications for upcoming stress test. NPO after midnight. No caffeine for 12 hours. Medication: patient stated that physician would like him to hold verapamil for testing. Patient also noted that he has always been switched to lexiscan because he cannot get his heart rate up. Patient also advised to hold trental prior to stress test. Patient seemed concerned because father had a heart attack during a stress test and subsequently passed away. Patient wanted to be sure staff took extra caution. Patient assured that staff will take appropriate caution and advised him to talk to staff about concerns prior to testing. Bring something to eat. Wear comfortable clothes and gym shoes. Patient verbalized understanding and denied any further questions.     
Patient is scheduled for nuclear stress test on 1/14/20 at 1:45. Please call to go over medications. Routed to stress test pool.  
[FreeTextEntry1] : Ms. Henson has a history of eosinophilic asthma, elevated IgE, allergies, HLD, and anxiety. She is doing well from a pulmonary perspective. Her number one issue is orthopedic (neck) - s/p surgery - Asthma is stable. \par \par Her shortness of breath is multifactorial due to:\par -poor mechanics of breathing \par -out of shape\par -pulmonary disease  \par \par problem 1: moderate persistent asthma (stable) \par -continue to use Advair 250 at 1 inhalation BID or Wixela 250 mg 1 inhalation BID\par -continue to use Spiriva 2 inhalations QD\par -continue to use Singulair 10 mg before bed\par \par -Asthma is believed to be caused by inherited (genetic) and environmental factor, but its exact cause is unknown. Asthma may be triggered by allergens, lung infections, or irritants in the air. Asthma triggers are different for each person\par -Inhaler technique reviewed as well as oral hygiene techniques reviewed with patient. Avoidance of cold air, extremes of temperature, rescue inhaler should be used before exercise. Order of medication reviewed with patient. Recommended use of a cool mist humidifier in the bedroom.\par \par problem 2: elevated IgE level and eosinophilia\par -she remains a candidate for Xolair and Nucala, respectfully \par -no need to implement at this point in time\par \par -The safety and efficacy of Nucala was established in three double-blind, randomized, placebo-controlled trials in patients with severe asthma. Compared to a placebo, patients with severe asthma receiving Nucala had fewer exacerbation requiring hospitalization and/or emergency department visits, and a longer time to first exacerbation. In addition, patients with severe asthma receiving Nucala experienced greater reductions in their daily maintenance oral corticosteroid dose, while maintaining asthma control compared with patients receiving placebo. Treatment with Nucala did not result in a significant improvement in lung function, as measured by the volume of air exhaled by patients in one second. The most common side effects include: headache, injection site reactions, back pain, weakness, and fatigue; hypersensitivity reactions can occur within hours or days including swelling of the face, mouth, and tongue, fainting, dizziness, hives, breathing problems, and rash; herpes zoster infections have occurred. The drug is a monoclonal antibody that inhibits interleukin -5 which helps regular eosinophils, a type of white blood cell that contributes to asthma. The over-production of eosinophils can cause inflammation in the lungs, increasing the frequency of asthma attacks. Patients must also take other medications, including high dose inhaled corticosteroids and at least one additional asthma drug.\par -Xolair is a recombinant DNA- derived humanized IgG1K monoclonal antibody that selectively binds ot human immunoglobulin E (IgE). Xolair is produced by a Chinese hamster ovary cell suspension culture in nutrient medium containing the antibiotic gentamicin. Gentamicin is not detectable in the final product. Xolair is a sterile, white, preservative free, lyophilized powder contained in a single use vial that is reconstituted with sterile water for suspension. Side effects include: wheezing, tightness of the chest, trouble breathing, hives, skin rash, feeling anxious or light-headed, fainting, warmth or tingling under skin, or swelling of face, lips, or tongue \par \par problem 3: allergic rhinitis\par -continue to use Clarinex 5 mg QHS\par -recommended to use Xlear nasal saline spray \par -continue Astelin 0.15% BID\par -Environmental measures for allergies were encouraged including mattress and pillow cover, air purifier, and environmental controls. \par \par problem 4: URI (if present)\par -recommended to take Aida seltzer cold and sinus\par \par problem 5: poor sleep (improved)\par Good sleep hygiene was encouraged including avoiding watching television an hour before bed, keeping caffeine at a low, avoiding reading, television, or anything, in bed, no drinking any liquids three hours before bedtime, and only getting into bed when tired and ready for sleep. \par \par problem 6: poor breathing mechanics\par -Proper breathing techniques were reviewed with an emphasis of exhalation. Patient instructed to breath in for 1 second and out for four seconds. Patient was encouraged to not talk while walking. \par \par problem 7: out of shape\par -Weight loss, exercise, and diet control were discussed and are highly encouraged. Treatment options were given such as, aqua therapy, and contacting a nutritionist. Recommended to use the elliptical, stationary bike, less use of treadmill. Mindful eating was explained to the patient Obesity is associated with worsening asthma, shortness of breath, and potential for cardiac disease, diabetes, and other underlying medical conditions\par \par problem 8: anxiety\par -recommended to read: "The Gift of Maybe," by Marta Gonzalez \par \par Problem 9: Health Maintenance/COVID19 Precautions:\par - Clean your hands often. Wash your hands often with soap and water for at least 20 seconds, especially after blowing your nose, coughing, or sneezing, or having been in a public place.\par - If soap and water are not available, use a hand  that contains at least 60% alcohol.\par - To the extent possible, avoid touching high-touch surfaces in public places - elevator buttons, door handles, handrails, handshaking with people, etc. Use a tissue or your sleeve to cover your hand or finger if you must touch something.\par - Wash your hands after touching surfaces in public places.\par - Avoid touching your face, nose, eyes, etc.\par - Clean and disinfect your home to remove germs: practice routine cleaning of frequently touched surfaces (for example: tables, doorknobs, light switches, handles, desks, toilets, faucets, sinks & cell phones)\par - Avoid crowds, especially in poorly ventilated spaces. Your risk of exposure to respiratory viruses like COVID-19 may increase in crowded, closed-in settings with little air circulation if\par there are people in the crowd who are sick. All patients are recommended to practice social distancing and stay at least 6 feet away from others.\par - Avoid all non-essential travel including plane trips, and especially avoid embarking on cruise ships.\par -If COVID-19 is spreading in your community, take extra measures to put distance between yourself and other people to further reduce your risk of being exposed to this new virus.\par -Stay home as much as possible.\par - Consider ways of getting food brought to your house through family, social, or commercial networks\par -Be aware that the virus has been known to live in the air up to 3 hours post exposure, cardboard up to 24 hours post exposure, copper up to 4 hours post exposure, steel and plastic up to 2-3 days post exposure. Risk of transmission from these surfaces are affected by many variables.\par \par Immune Support Recommendations:\par -OTC Vitamin C 500mg BID \par -OTC Quercetin 250-500mg BID \par -OTC Zinc 75-100mg per day \par -OTC Melatonin 1or 2mg a night \par -OTC Vitamin D 1-4000mg per day\par -Tonic Water 8oz\par -Recommended to Stay Hydrated (At least a gallon/day)\par \par Asthma and COVID19:\par You need to make sure your asthma is under control. This often requires the use of inhaled corticosteroids (and sometimes oral corticosteroids). Inhaled corticosteroids do not likely reduce your immune system’s ability to fight infections, but oral corticosteroids may. It is important to use the steps above to protect yourself to limit your exposure to any respiratory virus. \par \par problem 10:  health maintenance \par -Recommended to take alpha lipoic acid and L-glutamine\par -flu shot - 2019\par -Pneumovax 23 given March 2018/ [id number] 11/2016\par -she is being added CoQ-10 at 200 mg QD \par -recommended early intervention for URIs\par -recommended regular osteoporosis evaluations\par -recommended early dermatological evaluations\par -recommended after the age of 50 to the age of 70, colonoscopy every 5 years \par \par F/U in 4-6 months with SPI and NiOx\par She is encouraged to call with any changes, concerns, or questions.

## 2020-04-22 NOTE — ADDENDUM
[FreeTextEntry1] : Documented by Chivo Badillo acting as a scribe for Dr. Dorian Rojas on 04/22/2020 \par \par All medical record entries made by the Scribe were at my, Dr. Dorian Rojas's, direction and personally dictated by me on 04/22/2020 . I have reviewed the chart and agree that the record accurately reflects my personal performance of the history, physical exam, assessment and plan. I have also personally directed, reviewed, and agree with the discharge instructions.

## 2020-06-01 ENCOUNTER — RX RENEWAL (OUTPATIENT)
Age: 70
End: 2020-06-01

## 2020-06-21 ENCOUNTER — RX RENEWAL (OUTPATIENT)
Age: 70
End: 2020-06-21

## 2020-06-27 ENCOUNTER — RX RENEWAL (OUTPATIENT)
Age: 70
End: 2020-06-27

## 2020-06-29 PROBLEM — E78.5 HYPERLIPIDEMIA, UNSPECIFIED: Chronic | Status: ACTIVE | Noted: 2020-02-10

## 2020-06-29 PROBLEM — J82 PULMONARY EOSINOPHILIA, NOT ELSEWHERE CLASSIFIED: Chronic | Status: ACTIVE | Noted: 2020-02-10

## 2020-06-29 PROBLEM — M54.12 RADICULOPATHY, CERVICAL REGION: Chronic | Status: ACTIVE | Noted: 2020-02-10

## 2020-06-29 PROBLEM — M50.20 OTHER CERVICAL DISC DISPLACEMENT, UNSPECIFIED CERVICAL REGION: Chronic | Status: ACTIVE | Noted: 2020-02-10

## 2020-06-29 PROBLEM — M48.02 SPINAL STENOSIS, CERVICAL REGION: Chronic | Status: ACTIVE | Noted: 2020-02-10

## 2020-06-29 PROBLEM — F41.0 PANIC DISORDER [EPISODIC PAROXYSMAL ANXIETY]: Chronic | Status: ACTIVE | Noted: 2020-02-10

## 2020-06-30 ENCOUNTER — RX RENEWAL (OUTPATIENT)
Age: 70
End: 2020-06-30

## 2020-07-02 ENCOUNTER — RX RENEWAL (OUTPATIENT)
Age: 70
End: 2020-07-02

## 2020-08-18 ENCOUNTER — APPOINTMENT (OUTPATIENT)
Dept: PULMONOLOGY | Facility: CLINIC | Age: 70
End: 2020-08-18
Payer: MEDICARE

## 2020-08-18 PROCEDURE — 99213 OFFICE O/P EST LOW 20 MIN: CPT | Mod: 95

## 2020-08-18 NOTE — HISTORY OF PRESENT ILLNESS
[Home] : at home, [unfilled] , at the time of the visit. [Medical Office: ([facility])___] : at the medical office located in  [Self] : self [Patient] : the patient [FreeTextEntry2] : TUNDE LIZARRAGA  [FreeTextEntry1] : Ms. Henson is a 69 year old female with a history of allergic rhinitis, elevated IgE, eosinophilic asthma, severe persistent asthma, and SOB video calls into the office today for a follow up visit. Her chief complaint \par - has been doing well\par - denies any chest pain / pressure\par - has constipation sometimes \par - she has been sleeping well, about 6-7 hours overall \par - her weight has been stable\par - has been eating well\par - sense of smell / taste is good \par - no palpitations \par - her asthma has been controlled, except she had a little bit of a cough but it went away. \par - denies taking any new medications, vitamins, or supplements.\par -she denies any headaches, nausea, vomiting, fever, chills, sweats, chest pain, chest pressure, diarrhea, dysphagia, dizziness, sour taste in the mouth, leg swelling, leg pain, itchy eyes, itchy ears, heartburn, reflux, myalgias or arthralgias.

## 2020-08-18 NOTE — ADDENDUM
[FreeTextEntry1] : Documented by Carmen Martell acting as a scribe for Dr. Dorian Rojas on 08/18/2020.\par \par All medical record entries made by the Scribe were at my, Dr. Dorian Rojas's, direction and personally dictated by me on 08/18/2020. I have reviewed the chart and agree that the record accurately reflects my personal performance of the history, physical exam, assessment and plan. I have also personally directed, reviewed, and agree with the discharge instructions.

## 2020-08-18 NOTE — PHYSICAL EXAM
[No Acute Distress] : no acute distress [Well Nourished] : well nourished [Normal Appearance] : normal appearance [Well Groomed] : well groomed [No Deformities] : no deformities [Well Developed] : well developed [TextBox_2] : Appears Well.

## 2020-08-18 NOTE — ASSESSMENT
[FreeTextEntry1] : Ms. Henson is 69 y.o F who has a history of eosinophilic asthma, elevated IgE, allergies, HLD, and anxiety. She is doing well from a pulmonary perspective. She was spoken to via video call.  Her number one issue is orthopedic (neck) - s/p surgery improved- Asthma is stable. \par \par Her shortness of breath is multifactorial due to:\par -poor mechanics of breathing \par -out of shape\par -pulmonary disease  \par \par problem 1: moderate persistent asthma (stable) \par -continue to use Advair 250 at 1 inhalation BID or Wixela 250 mg 1 inhalation BID\par -continue to use Spiriva 2 inhalations QD\par -continue to use Singulair 10 mg before bed\par \par -Asthma is believed to be caused by inherited (genetic) and environmental factor, but its exact cause is unknown. Asthma may be triggered by allergens, lung infections, or irritants in the air. Asthma triggers are different for each person\par -Inhaler technique reviewed as well as oral hygiene techniques reviewed with patient. Avoidance of cold air, extremes of temperature, rescue inhaler should be used before exercise. Order of medication reviewed with patient. Recommended use of a cool mist humidifier in the bedroom.\par \par problem 2: elevated IgE level and eosinophilia\par -she remains a candidate for Xolair and Nucala, respectfully \par -no need to implement at this point in time\par \par -The safety and efficacy of Nucala was established in three double-blind, randomized, placebo-controlled trials in patients with severe asthma. Compared to a placebo, patients with severe asthma receiving Nucala had fewer exacerbation requiring hospitalization and/or emergency department visits, and a longer time to first exacerbation. In addition, patients with severe asthma receiving Nucala experienced greater reductions in their daily maintenance oral corticosteroid dose, while maintaining asthma control compared with patients receiving placebo. Treatment with Nucala did not result in a significant improvement in lung function, as measured by the volume of air exhaled by patients in one second. The most common side effects include: headache, injection site reactions, back pain, weakness, and fatigue; hypersensitivity reactions can occur within hours or days including swelling of the face, mouth, and tongue, fainting, dizziness, hives, breathing problems, and rash; herpes zoster infections have occurred. The drug is a monoclonal antibody that inhibits interleukin -5 which helps regular eosinophils, a type of white blood cell that contributes to asthma. The over-production of eosinophils can cause inflammation in the lungs, increasing the frequency of asthma attacks. Patients must also take other medications, including high dose inhaled corticosteroids and at least one additional asthma drug.\par -Xolair is a recombinant DNA- derived humanized IgG1K monoclonal antibody that selectively binds ot human immunoglobulin E (IgE). Xolair is produced by a Chinese hamster ovary cell suspension culture in nutrient medium containing the antibiotic gentamicin. Gentamicin is not detectable in the final product. Xolair is a sterile, white, preservative free, lyophilized powder contained in a single use vial that is reconstituted with sterile water for suspension. Side effects include: wheezing, tightness of the chest, trouble breathing, hives, skin rash, feeling anxious or light-headed, fainting, warmth or tingling under skin, or swelling of face, lips, or tongue \par \par problem 3: allergic rhinitis (stable)\par -continue to use Clarinex 5 mg QHS\par -recommended to use Xlear nasal saline spray \par -continue Astelin 0.15% BID\par -Environmental measures for allergies were encouraged including mattress and pillow cover, air purifier, and environmental controls. \par \par problem 4: URI (if present)\par -recommended to take Aida seltzer cold and sinus\par \par problem 5: poor sleep (improved)\par Good sleep hygiene was encouraged including avoiding watching television an hour before bed, keeping caffeine at a low, avoiding reading, television, or anything, in bed, no drinking any liquids three hours before bedtime, and only getting into bed when tired and ready for sleep. \par \par problem 6: poor breathing mechanics\par -Proper breathing techniques were reviewed with an emphasis of exhalation. Patient instructed to breath in for 1 second and out for four seconds. Patient was encouraged to not talk while walking. \par \par problem 7: out of shape\par -Weight loss, exercise, and diet control were discussed and are highly encouraged. Treatment options were given such as, aqua therapy, and contacting a nutritionist. Recommended to use the elliptical, stationary bike, less use of treadmill. Mindful eating was explained to the patient Obesity is associated with worsening asthma, shortness of breath, and potential for cardiac disease, diabetes, and other underlying medical conditions\par \par problem 8: anxiety\par -recommended to read: "The Gift of Maybe," by Marta Gonzalez \par \par Problem 9: Health Maintenance/COVID19 Precautions:\par - Clean your hands often. Wash your hands often with soap and water for at least 20 seconds, especially after blowing your nose, coughing, or sneezing, or having been in a public place.\par - If soap and water are not available, use a hand  that contains at least 60% alcohol.\par - To the extent possible, avoid touching high-touch surfaces in public places - elevator buttons, door handles, handrails, handshaking with people, etc. Use a tissue or your sleeve to cover your hand or finger if you must touch something.\par - Wash your hands after touching surfaces in public places.\par - Avoid touching your face, nose, eyes, etc.\par - Clean and disinfect your home to remove germs: practice routine cleaning of frequently touched surfaces (for example: tables, doorknobs, light switches, handles, desks, toilets, faucets, sinks & cell phones)\par - Avoid crowds, especially in poorly ventilated spaces. Your risk of exposure to respiratory viruses like COVID-19 may increase in crowded, closed-in settings with little air circulation if\par there are people in the crowd who are sick. All patients are recommended to practice social distancing and stay at least 6 feet away from others.\par - Avoid all non-essential travel including plane trips, and especially avoid embarking on cruise ships.\par -If COVID-19 is spreading in your community, take extra measures to put distance between yourself and other people to further reduce your risk of being exposed to this new virus.\par -Stay home as much as possible.\par - Consider ways of getting food brought to your house through family, social, or commercial networks\par -Be aware that the virus has been known to live in the air up to 3 hours post exposure, cardboard up to 24 hours post exposure, copper up to 4 hours post exposure, steel and plastic up to 2-3 days post exposure. Risk of transmission from these surfaces are affected by many variables.\par \par Immune Support Recommendations:\par -OTC Vitamin C 500mg BID \par -OTC Quercetin 250-500mg BID \par -OTC Zinc 75-100mg per day \par -OTC Melatonin 1or 2mg a night \par -OTC Vitamin D 1-4000mg per day\par -Tonic Water 8oz\par -Recommended to Stay Hydrated (At least a gallon/day)\par \par Asthma and COVID19:\par You need to make sure your asthma is under control. This often requires the use of inhaled corticosteroids (and sometimes oral corticosteroids). Inhaled corticosteroids do not likely reduce your immune system’s ability to fight infections, but oral corticosteroids may. It is important to use the steps above to protect yourself to limit your exposure to any respiratory virus. \par \par problem 10:  health maintenance \par -Recommended to take alpha lipoic acid and L-glutamine\par -flu shot - 2019\par -Pneumovax 23 given March 2018/ [id number] 11/2016\par -she is being added CoQ-10 at 200 mg QD \par -recommended early intervention for URIs\par -recommended regular osteoporosis evaluations\par -recommended early dermatological evaluations\par -recommended after the age of 50 to the age of 70, colonoscopy every 5 years \par \par F/U in 4-6 months with SPI and NiOx\par She is encouraged to call with any changes, concerns, or questions.

## 2020-08-26 ENCOUNTER — RX RENEWAL (OUTPATIENT)
Age: 70
End: 2020-08-26

## 2020-09-03 ENCOUNTER — RX RENEWAL (OUTPATIENT)
Age: 70
End: 2020-09-03

## 2020-09-09 ENCOUNTER — RX RENEWAL (OUTPATIENT)
Age: 70
End: 2020-09-09

## 2020-09-09 NOTE — REASON FOR VISIT
,[email or web address] [Follow-Up] : a follow-up visit [FreeTextEntry1] : Video Telehealth - allergic rhinitis, elevated IgE, eosinophilic asthma, severe persistent asthma, and SOB

## 2020-09-28 ENCOUNTER — OUTPATIENT (OUTPATIENT)
Dept: OUTPATIENT SERVICES | Facility: HOSPITAL | Age: 70
LOS: 1 days | End: 2020-09-28
Payer: MEDICARE

## 2020-09-28 ENCOUNTER — APPOINTMENT (OUTPATIENT)
Dept: CT IMAGING | Facility: CLINIC | Age: 70
End: 2020-09-28
Payer: MEDICARE

## 2020-09-28 DIAGNOSIS — M47.12 OTHER SPONDYLOSIS WITH MYELOPATHY, CERVICAL REGION: ICD-10-CM

## 2020-09-28 PROCEDURE — 72125 CT NECK SPINE W/O DYE: CPT

## 2020-09-28 PROCEDURE — 76376 3D RENDER W/INTRP POSTPROCES: CPT

## 2020-09-28 PROCEDURE — 72125 CT NECK SPINE W/O DYE: CPT | Mod: 26

## 2020-09-28 PROCEDURE — 76376 3D RENDER W/INTRP POSTPROCES: CPT | Mod: 26

## 2020-10-12 ENCOUNTER — APPOINTMENT (OUTPATIENT)
Dept: PULMONOLOGY | Facility: CLINIC | Age: 70
End: 2020-10-12
Payer: MEDICARE

## 2020-10-12 PROCEDURE — 90686 IIV4 VACC NO PRSV 0.5 ML IM: CPT

## 2020-10-12 PROCEDURE — G0008: CPT

## 2020-10-20 ENCOUNTER — APPOINTMENT (OUTPATIENT)
Dept: PULMONOLOGY | Facility: CLINIC | Age: 70
End: 2020-10-20
Payer: MEDICARE

## 2020-10-20 ENCOUNTER — NON-APPOINTMENT (OUTPATIENT)
Age: 70
End: 2020-10-20

## 2020-10-20 PROCEDURE — ZZZZZ: CPT

## 2020-11-30 ENCOUNTER — RX RENEWAL (OUTPATIENT)
Age: 70
End: 2020-11-30

## 2020-12-10 ENCOUNTER — APPOINTMENT (OUTPATIENT)
Dept: PULMONOLOGY | Facility: CLINIC | Age: 70
End: 2020-12-10
Payer: MEDICARE

## 2020-12-10 VITALS
HEIGHT: 64 IN | WEIGHT: 144 LBS | OXYGEN SATURATION: 97 % | RESPIRATION RATE: 16 BRPM | BODY MASS INDEX: 24.59 KG/M2 | DIASTOLIC BLOOD PRESSURE: 88 MMHG | TEMPERATURE: 97.1 F | SYSTOLIC BLOOD PRESSURE: 142 MMHG | HEART RATE: 71 BPM

## 2020-12-10 PROCEDURE — 99214 OFFICE O/P EST MOD 30 MIN: CPT

## 2020-12-10 NOTE — REASON FOR VISIT
[Follow-Up] : a follow-up visit [Family Member] : family member [FreeTextEntry1] : allergic rhinitis, elevated IgE, eosinophilic asthma, severe persistent asthma, and SOB

## 2020-12-10 NOTE — HISTORY OF PRESENT ILLNESS
[FreeTextEntry1] : Ms. Henson is a 70 year old female with a history of allergic rhinitis, elevated IgE, eosinophilic asthma, severe persistent asthma, and SOB presents into the office today for a follow up visit. Her chief complaint \par \par -she notes generally feeling well\par -she notes dysphagia resolved\par -she notes intermittent constipation\par -she notes constant itchy eyes\par -she notes visual issues\par -she notes energy levels 8.5/10 on average\par -she notes exercising regularly walking\par -she notes neck discomforts resolved\par -she one episode of allergy flair, but overall controlled \par \par \par -denies any chest pain, chest pressure, diarrhea, dysphagia, sour taste in the mouth, dizziness, leg swelling, leg pain, myalgias, arthralgias, itchy eyes, itchy ears, heartburn, or reflux.\par

## 2020-12-10 NOTE — ADDENDUM
[FreeTextEntry1] : Documented by Wicho Gibbons acting as a scribe for Dr. Dorian Rojas on 12/10/2020.\par \par All medical record entries made by the Scribe were at my, Dr. Dorian Rojas's, direction and personally dictated by me on 12/10/2020 . I have reviewed the chart and agree that the record accurately reflects my personal performance of the history, physical exam, assessment and plan. I have also personally directed, reviewed, and agree with the discharge instructions. \par

## 2021-02-14 ENCOUNTER — RX RENEWAL (OUTPATIENT)
Age: 71
End: 2021-02-14

## 2021-03-22 DIAGNOSIS — Z01.812 ENCOUNTER FOR PREPROCEDURAL LABORATORY EXAMINATION: ICD-10-CM

## 2021-03-29 ENCOUNTER — RX RENEWAL (OUTPATIENT)
Age: 71
End: 2021-03-29

## 2021-04-18 LAB — SARS-COV-2 N GENE NPH QL NAA+PROBE: NOT DETECTED

## 2021-04-22 ENCOUNTER — APPOINTMENT (OUTPATIENT)
Dept: PULMONOLOGY | Facility: CLINIC | Age: 71
End: 2021-04-22
Payer: MEDICARE

## 2021-04-22 VITALS
WEIGHT: 148 LBS | BODY MASS INDEX: 25.27 KG/M2 | HEART RATE: 71 BPM | OXYGEN SATURATION: 97 % | TEMPERATURE: 97.8 F | DIASTOLIC BLOOD PRESSURE: 80 MMHG | HEIGHT: 64 IN | RESPIRATION RATE: 16 BRPM | SYSTOLIC BLOOD PRESSURE: 119 MMHG

## 2021-04-22 PROCEDURE — 94729 DIFFUSING CAPACITY: CPT

## 2021-04-22 PROCEDURE — 94010 BREATHING CAPACITY TEST: CPT

## 2021-04-22 PROCEDURE — 95012 NITRIC OXIDE EXP GAS DETER: CPT

## 2021-04-22 PROCEDURE — ZZZZZ: CPT

## 2021-04-22 PROCEDURE — 99072 ADDL SUPL MATRL&STAF TM PHE: CPT

## 2021-04-22 PROCEDURE — 99214 OFFICE O/P EST MOD 30 MIN: CPT | Mod: 25

## 2021-04-22 PROCEDURE — 94727 GAS DIL/WSHOT DETER LNG VOL: CPT

## 2021-04-22 NOTE — ADDENDUM
[FreeTextEntry1] : Documented by Mrak Long acting as a scribe for Dr. Dorian Rojas on (04/22/2021).\par \par All medical record entries made by the Scribe were at my, Dr. Dorian Rojas's, direction and personally dictated by me on (04/22/2021). I have reviewed the chart and agree that the record accurately reflects my personal performance of the history, physical exam, assessment and plan. I have also personally directed, reviewed, and agree with the discharge instructions.\par

## 2021-04-22 NOTE — ASSESSMENT
[FreeTextEntry1] : Ms. Henson is 70 y.o F who has a history of eosinophilic asthma, elevated IgE, allergies, HLD, and anxiety. She is doing well from a pulmonary perspective. Her number one issue is gum issues\par \par Her shortness of breath is multifactorial due to:\par -poor mechanics of breathing \par -out of shape\par -pulmonary disease - asthma\par \par problem 1: moderate persistent asthma (stable) \par -continue to use Advair 250 at 1 inhalation BID or Wixela 250 mg 1 inhalation BID\par -continue to use Spiriva 2 inhalations QD\par -continue to use Singulair 10 mg before bed\par \par -Asthma is believed to be caused by inherited (genetic) and environmental factor, but its exact cause is unknown. Asthma may be triggered by allergens, lung infections, or irritants in the air. Asthma triggers are different for each person\par -Inhaler technique reviewed as well as oral hygiene techniques reviewed with patient. Avoidance of cold air, extremes of temperature, rescue inhaler should be used before exercise. Order of medication reviewed with patient. Recommended use of a cool mist humidifier in the bedroom.\par \par problem 2: elevated IgE level and eosinophilia\par -she remains a candidate for Xolair and Nucala, respectfully \par -no need to implement at this point in time\par \par -The safety and efficacy of Nucala was established in three double-blind, randomized, placebo-controlled trials in patients with severe asthma. Compared to a placebo, patients with severe asthma receiving Nucala had fewer exacerbation requiring hospitalization and/or emergency department visits, and a longer time to first exacerbation. In addition, patients with severe asthma receiving Nucala experienced greater reductions in their daily maintenance oral corticosteroid dose, while maintaining asthma control compared with patients receiving placebo. Treatment with Nucala did not result in a significant improvement in lung function, as measured by the volume of air exhaled by patients in one second. The most common side effects include: headache, injection site reactions, back pain, weakness, and fatigue; hypersensitivity reactions can occur within hours or days including swelling of the face, mouth, and tongue, fainting, dizziness, hives, breathing problems, and rash; herpes zoster infections have occurred. The drug is a monoclonal antibody that inhibits interleukin -5 which helps regular eosinophils, a type of white blood cell that contributes to asthma. The over-production of eosinophils can cause inflammation in the lungs, increasing the frequency of asthma attacks. Patients must also take other medications, including high dose inhaled corticosteroids and at least one additional asthma drug.\par -Xolair is a recombinant DNA- derived humanized IgG1K monoclonal antibody that selectively binds ot human immunoglobulin E (IgE). Xolair is produced by a Chinese hamster ovary cell suspension culture in nutrient medium containing the antibiotic gentamicin. Gentamicin is not detectable in the final product. Xolair is a sterile, white, preservative free, lyophilized powder contained in a single use vial that is reconstituted with sterile water for suspension. Side effects include: wheezing, tightness of the chest, trouble breathing, hives, skin rash, feeling anxious or light-headed, fainting, warmth or tingling under skin, or swelling of face, lips, or tongue \par \par problem 3: allergic rhinitis (stable)\par -continue to use Clarinex 5 mg QHS\par -recommended to use Xlear nasal saline spray \par -continue Astelin 0.15% BID\par -Environmental measures for allergies were encouraged including mattress and pillow cover, air purifier, and environmental controls. \par \par problem 4: URI (if present)\par -recommended to take Aida seltzer cold and sinus\par \par problem 5: poor sleep (improved)\par Good sleep hygiene was encouraged including avoiding watching television an hour before bed, keeping caffeine at a low, avoiding reading, television, or anything, in bed, no drinking any liquids three hours before bedtime, and only getting into bed when tired and ready for sleep. \par \par problem 6: poor breathing mechanics\par -Proper breathing techniques were reviewed with an emphasis of exhalation. Patient instructed to breath in for 1 second and out for four seconds. Patient was encouraged to not talk while walking. \par \par problem 7: out of shape\par -Weight loss, exercise, and diet control were discussed and are highly encouraged. Treatment options were given such as, aqua therapy, and contacting a nutritionist. Recommended to use the elliptical, stationary bike, less use of treadmill. Mindful eating was explained to the patient Obesity is associated with worsening asthma, shortness of breath, and potential for cardiac disease, diabetes, and other underlying medical conditions\par \par problem 8: anxiety\par -recommended to read: "The Gift of Maybe," by Marta Gonzalez \par \par Problem 9: Health Maintenance/COVID19 Precautions:\par - S/p Covid 19 vaccine (Moderna) x2 2/2021\par -Recommended Martell Ward (periodontist) for gum issues\par - Clean your hands often. Wash your hands often with soap and water for at least 20 seconds, especially after blowing your nose, coughing, or sneezing, or having been in a public place.\par - If soap and water are not available, use a hand  that contains at least 60% alcohol.\par - To the extent possible, avoid touching high-touch surfaces in public places - elevator buttons, door handles, handrails, handshaking with people, etc. Use a tissue or your sleeve to cover your hand or finger if you must touch something.\par - Wash your hands after touching surfaces in public places.\par - Avoid touching your face, nose, eyes, etc.\par - Clean and disinfect your home to remove germs: practice routine cleaning of frequently touched surfaces (for example: tables, doorknobs, light switches, handles, desks, toilets, faucets, sinks & cell phones)\par - Avoid crowds, especially in poorly ventilated spaces. Your risk of exposure to respiratory viruses like COVID-19 may increase in crowded, closed-in settings with little air circulation if\par there are people in the crowd who are sick. All patients are recommended to practice social distancing and stay at least 6 feet away from others.\par - Avoid all non-essential travel including plane trips, and especially avoid embarking on cruise ships.\par -If COVID-19 is spreading in your community, take extra measures to put distance between yourself and other people to further reduce your risk of being exposed to this new virus.\par -Stay home as much as possible.\par - Consider ways of getting food brought to your house through family, social, or commercial networks\par -Be aware that the virus has been known to live in the air up to 3 hours post exposure, cardboard up to 24 hours post exposure, copper up to 4 hours post exposure, steel and plastic up to 2-3 days post exposure. Risk of transmission from these surfaces are affected by many variables.\par \par Immune Support Recommendations:\par -OTC Vitamin C 500mg BID \par -OTC Quercetin 250-500mg BID \par -OTC Zinc 75-100mg per day \par -OTC Melatonin 1or 2mg a night \par -OTC Vitamin D 1-4000mg per day\par -Tonic Water 8oz\par -Recommended to Stay Hydrated (At least a gallon/day)\par \par Asthma and COVID19:\par You need to make sure your asthma is under control. This often requires the use of inhaled corticosteroids (and sometimes oral corticosteroids). Inhaled corticosteroids do not likely reduce your immune system’s ability to fight infections, but oral corticosteroids may. It is important to use the steps above to protect yourself to limit your exposure to any respiratory virus. \par \par problem 10:  health maintenance \par -Recommended to take alpha lipoic acid and L-glutamine\par -flu shot - 2020\par -Pneumovax 23 given March 2018/ [id number] 11/2016\par -she is being added CoQ-10 at 200 mg QD \par -recommended early intervention for URIs\par -recommended regular osteoporosis evaluations\par -recommended early dermatological evaluations\par -recommended after the age of 50 to the age of 70, colonoscopy every 5 years \par \par F/U in 4-6 months with SPI and NiOx\par She is encouraged to call with any changes, concerns, or questions.

## 2021-04-22 NOTE — PROCEDURE
[FreeTextEntry1] : Full PFT revealed mild-moderate obstructive dysfunction, with a FEV1 of 1.49L, which is 69% of predicted, mildly decreased lung volumes, and a diffusion of ,19.2 which is 108% of predicted, with a normal flow volume loop \par \par Feno was 15; a normal value being less than 25. Fractional exhaled nitric oxide (FENO) is regarded as a simple, noninvasive method for assessing eosinophilic airway inflammation. Produced by a variety of cells within the lung, nitric oxide (NO) concentrations are generally low in healthy individuals. However, high concentrations of NO appear to be involved in nonspecific host defense mechanisms and chronic inflammatory  diseases such as asthma. The American Thoracic Society (ATS) therefore recommended using FENO to aid in the diagnosis and monitoring of eosinophilic airway inflammation and asthma, and for identifying steroid responsive individuals whose chronic respiratory symptoms may be caused by airway inflammation \par  \par \par -Images and procedures reviewed in detail and discussed with patient.

## 2021-04-22 NOTE — PHYSICAL EXAM

## 2021-04-22 NOTE — HISTORY OF PRESENT ILLNESS
[FreeTextEntry1] : Ms. Henson is a 70 year old female with a history of allergic rhinitis, elevated IgE, eosinophilic asthma, severe persistent asthma, and SOB presents into the office today for a follow up visit. Her chief complaint \par -she notes feeling good in general\par -she notes intermittent constipation\par -she notes she most likely doesn't drink enough water\par -she notes drinking two mugs of coffee per day\par -she notes she is starting to walk again for exercise\par -she notes some occasional hip/leg pain\par -she notes itchy eyes\par -she notes some visual problems\par -she notes she has been sleeping well\par -she notes she is trying to lose weight\par -she notes having gum issues\par - S/p Covid 19 vaccine (Moderna) x2 2/2021\par \par patient denies any headaches, nausea, vomiting, fever, chills, sweats, chest pain, chest pressure, palpitations, coughing, wheezing, fatigue, diarrhea, dysphagia, myalgias, dizziness, leg swelling, leg pain, itchy ears, heartburn, reflux or sour taste in the mouth

## 2021-05-23 ENCOUNTER — RX RENEWAL (OUTPATIENT)
Age: 71
End: 2021-05-23

## 2021-05-25 ENCOUNTER — RX RENEWAL (OUTPATIENT)
Age: 71
End: 2021-05-25

## 2021-06-01 ENCOUNTER — RX RENEWAL (OUTPATIENT)
Age: 71
End: 2021-06-01

## 2021-08-16 ENCOUNTER — RX RENEWAL (OUTPATIENT)
Age: 71
End: 2021-08-16

## 2021-08-24 ENCOUNTER — RX RENEWAL (OUTPATIENT)
Age: 71
End: 2021-08-24

## 2021-08-26 ENCOUNTER — RX RENEWAL (OUTPATIENT)
Age: 71
End: 2021-08-26

## 2021-08-30 ENCOUNTER — APPOINTMENT (OUTPATIENT)
Dept: PULMONOLOGY | Facility: CLINIC | Age: 71
End: 2021-08-30

## 2021-10-11 ENCOUNTER — APPOINTMENT (OUTPATIENT)
Dept: PULMONOLOGY | Facility: CLINIC | Age: 71
End: 2021-10-11
Payer: MEDICARE

## 2021-10-11 VITALS
BODY MASS INDEX: 26.58 KG/M2 | HEART RATE: 70 BPM | DIASTOLIC BLOOD PRESSURE: 80 MMHG | TEMPERATURE: 97.7 F | RESPIRATION RATE: 16 BRPM | OXYGEN SATURATION: 97 % | WEIGHT: 150 LBS | HEIGHT: 63 IN | SYSTOLIC BLOOD PRESSURE: 123 MMHG

## 2021-10-11 PROCEDURE — 99214 OFFICE O/P EST MOD 30 MIN: CPT | Mod: 25

## 2021-10-11 PROCEDURE — 90682 RIV4 VACC RECOMBINANT DNA IM: CPT

## 2021-10-11 PROCEDURE — 94618 PULMONARY STRESS TESTING: CPT

## 2021-10-11 PROCEDURE — G0008: CPT

## 2021-10-11 NOTE — ADDENDUM
[FreeTextEntry1] : Documented by Sebas Aldana acting as a scribe for Dr. Dorian Rojas on (10/11/2021).\par \par All medical record entries made by the Scribe were at my, Dr. Dorian Rojas's, direction and personally dictated by me on (10/11/2021). I have reviewed the chart and agree that the record accurately reflects my personal performance of the history, physical exam, assessment and plan. I have also personally directed, reviewed, and agree with the discharge instructions.\par

## 2021-10-11 NOTE — PROCEDURE
[FreeTextEntry1] : 6 minute walk test reveals a low saturation of 96% with no evidence of dyspnea or fatigue; walked 374.9meters\par

## 2021-10-11 NOTE — PHYSICAL EXAM

## 2021-10-11 NOTE — ASSESSMENT
[FreeTextEntry1] : Ms. Henson is 71 y.o F who has a history of eosinophilic asthma, elevated IgE, allergies, HLD, and anxiety. She is doing well from a pulmonary perspective. Her number one issue is pandemic issues \par \par Her shortness of breath is multifactorial due to:\par -poor mechanics of breathing \par -out of shape\par -pulmonary disease - asthma\par \par problem 1: moderate persistent asthma (stable) \par -continue to use Advair 250 at 1 inhalation BID or Wixela 250 mg 1 inhalation BID\par -continue to use Spiriva 2 inhalations QD\par -continue to use Singulair 10 mg before bed\par \par -Asthma is believed to be caused by inherited (genetic) and environmental factor, but its exact cause is unknown. Asthma may be triggered by allergens, lung infections, or irritants in the air. Asthma triggers are different for each person\par -Inhaler technique reviewed as well as oral hygiene techniques reviewed with patient. Avoidance of cold air, extremes of temperature, rescue inhaler should be used before exercise. Order of medication reviewed with patient. Recommended use of a cool mist humidifier in the bedroom.\par \par problem 2: elevated IgE level and eosinophilia\par -she remains a candidate for Xolair and Nucala, respectfully \par -no need to implement at this point in time\par \par -The safety and efficacy of Nucala was established in three double-blind, randomized, placebo-controlled trials in patients with severe asthma. Compared to a placebo, patients with severe asthma receiving Nucala had fewer exacerbation requiring hospitalization and/or emergency department visits, and a longer time to first exacerbation. In addition, patients with severe asthma receiving Nucala experienced greater reductions in their daily maintenance oral corticosteroid dose, while maintaining asthma control compared with patients receiving placebo. Treatment with Nucala did not result in a significant improvement in lung function, as measured by the volume of air exhaled by patients in one second. The most common side effects include: headache, injection site reactions, back pain, weakness, and fatigue; hypersensitivity reactions can occur within hours or days including swelling of the face, mouth, and tongue, fainting, dizziness, hives, breathing problems, and rash; herpes zoster infections have occurred. The drug is a monoclonal antibody that inhibits interleukin -5 which helps regular eosinophils, a type of white blood cell that contributes to asthma. The over-production of eosinophils can cause inflammation in the lungs, increasing the frequency of asthma attacks. Patients must also take other medications, including high dose inhaled corticosteroids and at least one additional asthma drug.\par -Xolair is a recombinant DNA- derived humanized IgG1K monoclonal antibody that selectively binds ot human immunoglobulin E (IgE). Xolair is produced by a Chinese hamster ovary cell suspension culture in nutrient medium containing the antibiotic gentamicin. Gentamicin is not detectable in the final product. Xolair is a sterile, white, preservative free, lyophilized powder contained in a single use vial that is reconstituted with sterile water for suspension. Side effects include: wheezing, tightness of the chest, trouble breathing, hives, skin rash, feeling anxious or light-headed, fainting, warmth or tingling under skin, or swelling of face, lips, or tongue \par \par problem 3: allergic rhinitis (stable)\par -continue to use Clarinex 5 mg QHS\par -recommended to use Xlear nasal saline spray \par -continue Astelin 0.15% BID\par -Environmental measures for allergies were encouraged including mattress and pillow cover, air purifier, and environmental controls. \par \par problem 4: URI (if present)\par -recommended to take Aida seltzer cold and sinus\par \par problem 5: poor sleep (improved)\par Good sleep hygiene was encouraged including avoiding watching television an hour before bed, keeping caffeine at a low, avoiding reading, television, or anything, in bed, no drinking any liquids three hours before bedtime, and only getting into bed when tired and ready for sleep. \par \par problem 6: poor breathing mechanics\par -Proper breathing techniques were reviewed with an emphasis of exhalation. Patient instructed to breath in for 1 second and out for four seconds. Patient was encouraged to not talk while walking. \par \par problem 7: out of shape\par -Weight loss, exercise, and diet control were discussed and are highly encouraged. Treatment options were given such as, aqua therapy, and contacting a nutritionist. Recommended to use the elliptical, stationary bike, less use of treadmill. Mindful eating was explained to the patient Obesity is associated with worsening asthma, shortness of breath, and potential for cardiac disease, diabetes, and other underlying medical conditions\par \par problem 8: anxiety\par -recommended to read: "The Gift of Maybe," by Marta Gonzalez \par \par Problem 9: Health Maintenance/COVID19 Precautions:\par - S/p Covid 19 vaccine (Moderna) x2 2/2021\par - Clean your hands often. Wash your hands often with soap and water for at least 20 seconds, especially after blowing your nose, coughing, or sneezing, or having been in a public place.\par - If soap and water are not available, use a hand  that contains at least 60% alcohol.\par - To the extent possible, avoid touching high-touch surfaces in public places - elevator buttons, door handles, handrails, handshaking with people, etc. Use a tissue or your sleeve to cover your hand or finger if you must touch something.\par - Wash your hands after touching surfaces in public places.\par - Avoid touching your face, nose, eyes, etc.\par - Clean and disinfect your home to remove germs: practice routine cleaning of frequently touched surfaces (for example: tables, doorknobs, light switches, handles, desks, toilets, faucets, sinks & cell phones)\par - Avoid crowds, especially in poorly ventilated spaces. Your risk of exposure to respiratory viruses like COVID-19 may increase in crowded, closed-in settings with little air circulation if\par there are people in the crowd who are sick. All patients are recommended to practice social distancing and stay at least 6 feet away from others.\par - Avoid all non-essential travel including plane trips, and especially avoid embarking on cruise ships.\par -If COVID-19 is spreading in your community, take extra measures to put distance between yourself and other people to further reduce your risk of being exposed to this new virus.\par -Stay home as much as possible.\par - Consider ways of getting food brought to your house through family, social, or commercial networks\par -Be aware that the virus has been known to live in the air up to 3 hours post exposure, cardboard up to 24 hours post exposure, copper up to 4 hours post exposure, steel and plastic up to 2-3 days post exposure. Risk of transmission from these surfaces are affected by many variables.\par \par Immune Support Recommendations:\par -OTC Vitamin C 500mg BID \par -OTC Quercetin 250-500mg BID \par -OTC Zinc 75-100mg per day \par -OTC Melatonin 1or 2mg a night \par -OTC Vitamin D 1-4000mg per day\par -Tonic Water 8oz\par -Recommended to Stay Hydrated (At least a gallon/day)\par \par Asthma and COVID19:\par You need to make sure your asthma is under control. This often requires the use of inhaled corticosteroids (and sometimes oral corticosteroids). Inhaled corticosteroids do not likely reduce your immune system’s ability to fight infections, but oral corticosteroids may. It is important to use the steps above to protect yourself to limit your exposure to any respiratory virus. \par \par problem 10:  health maintenance \par -Recommend Mouth Kote\par -Recommended to take alpha lipoic acid and L-glutamine\par -half of flu shot (10/11/2021) \par -Pneumovax 23 given March 2018/ [id number] 11/2016\par -she is being added CoQ-10 at 200 mg QD \par -recommended early intervention for URIs\par -recommended regular osteoporosis evaluations\par -recommended early dermatological evaluations\par -recommended after the age of 50 to the age of 70, colonoscopy every 5 years \par \par F/U in 4-6 months with SPI and NiOx\par She is encouraged to call with any changes, concerns, or questions.

## 2021-10-11 NOTE — HISTORY OF PRESENT ILLNESS
[FreeTextEntry1] : Ms. Henson is a 71 year old female with a history of allergic rhinitis, elevated IgE, eosinophilic asthma, severe persistent asthma, and SOB presents into the office today for a follow up visit. Her chief complaint \par -she notes concerns regarding booster shot \par -she notes feeling generally good \par -she notes constipation \par -she notes sleeping 6-7 hours/night \par -she denies swelling \par -she notes sleeping well\par -she notes interrupted sleep to use the restroom \par -she notes drinking right before bed \par -she notes her energy levels are at a 7.5/10 \par -she notes biking a few times a week \par -she notes her gums were better than before\par \par patient denies any headaches, nausea, vomiting, fever, chills, sweats, chest pain, chest pressure, palpitations, coughing, wheezing, fatigue, diarrhea, dysphagia, myalgias, dizziness, leg swelling, leg pain, itchy eyes, itchy ears, heartburn, reflux or sour taste in the mouth

## 2021-10-18 ENCOUNTER — APPOINTMENT (OUTPATIENT)
Dept: PULMONOLOGY | Facility: CLINIC | Age: 71
End: 2021-10-18

## 2022-02-01 NOTE — ASSESSMENT
Last Visit Date: 10/5/2021   Next Visit Date: Visit date not found
[FreeTextEntry1] : Ms. Henson is 70 y.o F who has a history of eosinophilic asthma, elevated IgE, allergies, HLD, and anxiety. She is doing well from a pulmonary perspective. Her number one issue is Asthma - stable. \par \par Her shortness of breath is multifactorial due to:\par -poor mechanics of breathing \par -out of shape\par -pulmonary disease  \par \par problem 1: moderate persistent asthma (stable) \par -continue to use Advair 250 at 1 inhalation BID or Wixela 250 mg 1 inhalation BID\par -continue to use Spiriva 2 inhalations QD\par -continue to use Singulair 10 mg before bed\par \par -Asthma is believed to be caused by inherited (genetic) and environmental factor, but its exact cause is unknown. Asthma may be triggered by allergens, lung infections, or irritants in the air. Asthma triggers are different for each person\par -Inhaler technique reviewed as well as oral hygiene techniques reviewed with patient. Avoidance of cold air, extremes of temperature, rescue inhaler should be used before exercise. Order of medication reviewed with patient. Recommended use of a cool mist humidifier in the bedroom.\par \par problem 2: elevated IgE level and eosinophilia\par -she remains a candidate for Xolair and Nucala, respectfully \par -no need to implement at this point in time\par \par -The safety and efficacy of Nucala was established in three double-blind, randomized, placebo-controlled trials in patients with severe asthma. Compared to a placebo, patients with severe asthma receiving Nucala had fewer exacerbation requiring hospitalization and/or emergency department visits, and a longer time to first exacerbation. In addition, patients with severe asthma receiving Nucala experienced greater reductions in their daily maintenance oral corticosteroid dose, while maintaining asthma control compared with patients receiving placebo. Treatment with Nucala did not result in a significant improvement in lung function, as measured by the volume of air exhaled by patients in one second. The most common side effects include: headache, injection site reactions, back pain, weakness, and fatigue; hypersensitivity reactions can occur within hours or days including swelling of the face, mouth, and tongue, fainting, dizziness, hives, breathing problems, and rash; herpes zoster infections have occurred. The drug is a monoclonal antibody that inhibits interleukin -5 which helps regular eosinophils, a type of white blood cell that contributes to asthma. The over-production of eosinophils can cause inflammation in the lungs, increasing the frequency of asthma attacks. Patients must also take other medications, including high dose inhaled corticosteroids and at least one additional asthma drug.\par -Xolair is a recombinant DNA- derived humanized IgG1K monoclonal antibody that selectively binds ot human immunoglobulin E (IgE). Xolair is produced by a Chinese hamster ovary cell suspension culture in nutrient medium containing the antibiotic gentamicin. Gentamicin is not detectable in the final product. Xolair is a sterile, white, preservative free, lyophilized powder contained in a single use vial that is reconstituted with sterile water for suspension. Side effects include: wheezing, tightness of the chest, trouble breathing, hives, skin rash, feeling anxious or light-headed, fainting, warmth or tingling under skin, or swelling of face, lips, or tongue \par \par problem 3: allergic rhinitis (stable)\par -continue to use Clarinex 5 mg QHS\par -recommended to use Xlear nasal saline spray \par -continue Astelin 0.15% BID\par -Environmental measures for allergies were encouraged including mattress and pillow cover, air purifier, and environmental controls. \par \par problem 4: URI (if present)\par -recommended to take Aida seltzer cold and sinus\par \par problem 5: poor sleep (improved)\par Good sleep hygiene was encouraged including avoiding watching television an hour before bed, keeping caffeine at a low, avoiding reading, television, or anything, in bed, no drinking any liquids three hours before bedtime, and only getting into bed when tired and ready for sleep. \par \par problem 6: poor breathing mechanics\par -Proper breathing techniques were reviewed with an emphasis of exhalation. Patient instructed to breath in for 1 second and out for four seconds. Patient was encouraged to not talk while walking. \par \par problem 7: out of shape\par -Weight loss, exercise, and diet control were discussed and are highly encouraged. Treatment options were given such as, aqua therapy, and contacting a nutritionist. Recommended to use the elliptical, stationary bike, less use of treadmill. Mindful eating was explained to the patient Obesity is associated with worsening asthma, shortness of breath, and potential for cardiac disease, diabetes, and other underlying medical conditions\par \par problem 8: anxiety\par -recommended to read: "The Gift of Maybe," by Marta Gonzalez \par \par Problem 9: Health Maintenance/COVID19 Precautions:\par - Clean your hands often. Wash your hands often with soap and water for at least 20 seconds, especially after blowing your nose, coughing, or sneezing, or having been in a public place.\par - If soap and water are not available, use a hand  that contains at least 60% alcohol.\par - To the extent possible, avoid touching high-touch surfaces in public places - elevator buttons, door handles, handrails, handshaking with people, etc. Use a tissue or your sleeve to cover your hand or finger if you must touch something.\par - Wash your hands after touching surfaces in public places.\par - Avoid touching your face, nose, eyes, etc.\par - Clean and disinfect your home to remove germs: practice routine cleaning of frequently touched surfaces (for example: tables, doorknobs, light switches, handles, desks, toilets, faucets, sinks & cell phones)\par - Avoid crowds, especially in poorly ventilated spaces. Your risk of exposure to respiratory viruses like COVID-19 may increase in crowded, closed-in settings with little air circulation if\par there are people in the crowd who are sick. All patients are recommended to practice social distancing and stay at least 6 feet away from others.\par - Avoid all non-essential travel including plane trips, and especially avoid embarking on cruise ships.\par -If COVID-19 is spreading in your community, take extra measures to put distance between yourself and other people to further reduce your risk of being exposed to this new virus.\par -Stay home as much as possible.\par - Consider ways of getting food brought to your house through family, social, or commercial networks\par -Be aware that the virus has been known to live in the air up to 3 hours post exposure, cardboard up to 24 hours post exposure, copper up to 4 hours post exposure, steel and plastic up to 2-3 days post exposure. Risk of transmission from these surfaces are affected by many variables.\par \par Immune Support Recommendations:\par -OTC Vitamin C 500mg BID \par -OTC Quercetin 250-500mg BID \par -OTC Zinc 75-100mg per day \par -OTC Melatonin 1or 2mg a night \par -OTC Vitamin D 1-4000mg per day\par -Tonic Water 8oz\par -Recommended to Stay Hydrated (At least a gallon/day)\par \par Asthma and COVID19:\par You need to make sure your asthma is under control. This often requires the use of inhaled corticosteroids (and sometimes oral corticosteroids). Inhaled corticosteroids do not likely reduce your immune system’s ability to fight infections, but oral corticosteroids may. It is important to use the steps above to protect yourself to limit your exposure to any respiratory virus. \par \par problem 10:  health maintenance \par -Recommended to take alpha lipoic acid and L-glutamine\par -flu shot - 2020\par -Pneumovax 23 given March 2018/ [id number] 11/2016\par -she is being added CoQ-10 at 200 mg QD \par -recommended early intervention for URIs\par -recommended regular osteoporosis evaluations\par -recommended early dermatological evaluations\par -recommended after the age of 50 to the age of 70, colonoscopy every 5 years \par \par F/U in 4-6 months with SPI and NiOx\par She is encouraged to call with any changes, concerns, or questions.

## 2022-02-08 ENCOUNTER — APPOINTMENT (OUTPATIENT)
Dept: PULMONOLOGY | Facility: CLINIC | Age: 72
End: 2022-02-08

## 2022-03-07 NOTE — H&P PST ADULT - GASTROINTESTINAL DETAILS
[Acute] : an acute visit [FreeTextEntry1] : New pt here for weeping right leg edema  bowel sounds normal/no masses palpable/no distention/soft/nontender

## 2022-04-21 ENCOUNTER — APPOINTMENT (OUTPATIENT)
Dept: PULMONOLOGY | Facility: CLINIC | Age: 72
End: 2022-04-21
Payer: MEDICARE

## 2022-04-21 ENCOUNTER — NON-APPOINTMENT (OUTPATIENT)
Age: 72
End: 2022-04-21

## 2022-04-21 VITALS
BODY MASS INDEX: 26.22 KG/M2 | SYSTOLIC BLOOD PRESSURE: 123 MMHG | HEIGHT: 63 IN | RESPIRATION RATE: 16 BRPM | OXYGEN SATURATION: 98 % | WEIGHT: 148 LBS | TEMPERATURE: 97.3 F | DIASTOLIC BLOOD PRESSURE: 63 MMHG | HEART RATE: 75 BPM

## 2022-04-21 PROCEDURE — 99214 OFFICE O/P EST MOD 30 MIN: CPT | Mod: 25

## 2022-04-21 PROCEDURE — 95012 NITRIC OXIDE EXP GAS DETER: CPT

## 2022-04-21 PROCEDURE — 94010 BREATHING CAPACITY TEST: CPT

## 2022-04-21 NOTE — HISTORY OF PRESENT ILLNESS
[FreeTextEntry1] : Ms. eHnson is a 71 year old female with a history of allergic rhinitis, elevated IgE, eosinophilic asthma, severe persistent asthma, and SOB presents into the office today for a follow up visit. Her chief complaint \par -she notes feeling well\par -she notes that she is walking for exercise\par -she notes constipation once in a while\par -she notes getting enough sleep (6-7 hours)\par -she notes that her sleep is interrupted\par -she denies any visual issues \par -she notes her sinuses are non problematic\par -she notes arthritis in her hip\par -no new medications, vitamins, or supplements \par -she notes she is on Clarinex \par \par -patient denies any headaches, nausea, vomiting, fever, chills, sweats, chest pain, chest pressure, palpitations, coughing, wheezing, fatigue, diarrhea, dysphagia, myalgias, dizziness, leg swelling, leg pain, itchy eyes, itchy ears, heartburn, reflux or sour taste in the mouth

## 2022-04-21 NOTE — ASSESSMENT
[FreeTextEntry1] : Ms. Henson is 71 y.o F who has a history of eosinophilic asthma, elevated IgE, allergies, HLD, and anxiety. She is doing well from a pulmonary perspective. Her number one issue is pandemic issues (still)\par \par Her shortness of breath is multifactorial due to:\par -poor mechanics of breathing \par -out of shape\par -pulmonary disease - asthma\par \par problem 1: moderate persistent asthma (stable) \par -continue to use Advair 250 at 1 inhalation BID or Wixela 250 mg 1 inhalation BID\par -continue to use Spiriva 2 inhalations QD\par -continue to use Singulair 10 mg before bed\par \par -Asthma is believed to be caused by inherited (genetic) and environmental factor, but its exact cause is unknown. Asthma may be triggered by allergens, lung infections, or irritants in the air. Asthma triggers are different for each person\par -Inhaler technique reviewed as well as oral hygiene techniques reviewed with patient. Avoidance of cold air, extremes of temperature, rescue inhaler should be used before exercise. Order of medication reviewed with patient. Recommended use of a cool mist humidifier in the bedroom.\par \par problem 2: elevated IgE level and eosinophilia\par -she remains a candidate for Xolair and Nucala, respectfully \par -no need to implement at this point in time\par \par -The safety and efficacy of Nucala was established in three double-blind, randomized, placebo-controlled trials in patients with severe asthma. Compared to a placebo, patients with severe asthma receiving Nucala had fewer exacerbation requiring hospitalization and/or emergency department visits, and a longer time to first exacerbation. In addition, patients with severe asthma receiving Nucala experienced greater reductions in their daily maintenance oral corticosteroid dose, while maintaining asthma control compared with patients receiving placebo. Treatment with Nucala did not result in a significant improvement in lung function, as measured by the volume of air exhaled by patients in one second. The most common side effects include: headache, injection site reactions, back pain, weakness, and fatigue; hypersensitivity reactions can occur within hours or days including swelling of the face, mouth, and tongue, fainting, dizziness, hives, breathing problems, and rash; herpes zoster infections have occurred. The drug is a monoclonal antibody that inhibits interleukin -5 which helps regular eosinophils, a type of white blood cell that contributes to asthma. The over-production of eosinophils can cause inflammation in the lungs, increasing the frequency of asthma attacks. Patients must also take other medications, including high dose inhaled corticosteroids and at least one additional asthma drug.\par -Xolair is a recombinant DNA- derived humanized IgG1K monoclonal antibody that selectively binds ot human immunoglobulin E (IgE). Xolair is produced by a Chinese hamster ovary cell suspension culture in nutrient medium containing the antibiotic gentamicin. Gentamicin is not detectable in the final product. Xolair is a sterile, white, preservative free, lyophilized powder contained in a single use vial that is reconstituted with sterile water for suspension. Side effects include: wheezing, tightness of the chest, trouble breathing, hives, skin rash, feeling anxious or light-headed, fainting, warmth or tingling under skin, or swelling of face, lips, or tongue \par \par problem 3: allergic rhinitis (stable)\par -add Allegra 180 mg QAM \par -continue to use Clarinex 5 mg QHS\par -recommended to use Xlear nasal saline spray \par -continue Astelin 0.15% BID\par -Environmental measures for allergies were encouraged including mattress and pillow cover, air purifier, and environmental controls. \par \par problem 4: URI (if present)\par -recommended to take Aida seltzer cold and sinus\par \par problem 5: poor sleep (improved)\par Good sleep hygiene was encouraged including avoiding watching television an hour before bed, keeping caffeine at a low, avoiding reading, television, or anything, in bed, no drinking any liquids three hours before bedtime, and only getting into bed when tired and ready for sleep. \par \par problem 6: poor breathing mechanics\par -Recommended Wim Hof and Buteyko breathing techniques \par -Proper breathing techniques were reviewed with an emphasis of exhalation. Patient instructed to breath in for 1 second and out for four seconds. Patient was encouraged to not talk while walking. \par \par problem 7: out of shape\par -Weight loss, exercise, and diet control were discussed and are highly encouraged. Treatment options were given such as, aqua therapy, and contacting a nutritionist. Recommended to use the elliptical, stationary bike, less use of treadmill. Mindful eating was explained to the patient Obesity is associated with worsening asthma, shortness of breath, and potential for cardiac disease, diabetes, and other underlying medical conditions\par \par problem 8: anxiety\par -recommended to read: "The Gift of Maybe," by Marta Gonzalez \par \par Problem 9: Health Maintenance/COVID19 Precautions:\par - S/p Covid 19 vaccine (Moderna) x3\par - Clean your hands often. Wash your hands often with soap and water for at least 20 seconds, especially after blowing your nose, coughing, or sneezing, or having been in a public place.\par - If soap and water are not available, use a hand  that contains at least 60% alcohol.\par - To the extent possible, avoid touching high-touch surfaces in public places - elevator buttons, door handles, handrails, handshaking with people, etc. Use a tissue or your sleeve to cover your hand or finger if you must touch something.\par - Wash your hands after touching surfaces in public places.\par - Avoid touching your face, nose, eyes, etc.\par - Clean and disinfect your home to remove germs: practice routine cleaning of frequently touched surfaces (for example: tables, doorknobs, light switches, handles, desks, toilets, faucets, sinks & cell phones)\par - Avoid crowds, especially in poorly ventilated spaces. Your risk of exposure to respiratory viruses like COVID-19 may increase in crowded, closed-in settings with little air circulation if\par there are people in the crowd who are sick. All patients are recommended to practice social distancing and stay at least 6 feet away from others.\par - Avoid all non-essential travel including plane trips, and especially avoid embarking on cruise ships.\par -If COVID-19 is spreading in your community, take extra measures to put distance between yourself and other people to further reduce your risk of being exposed to this new virus.\par -Stay home as much as possible.\par - Consider ways of getting food brought to your house through family, social, or commercial networks\par -Be aware that the virus has been known to live in the air up to 3 hours post exposure, cardboard up to 24 hours post exposure, copper up to 4 hours post exposure, steel and plastic up to 2-3 days post exposure. Risk of transmission from these surfaces are affected by many variables.\par \par Immune Support Recommendations:\par -OTC Vitamin C 500mg BID \par -OTC Quercetin 250-500mg BID \par -OTC Zinc 75-100mg per day \par -OTC Melatonin 1or 2mg a night \par -OTC Vitamin D 1-4000mg per day\par -Tonic Water 8oz\par -Recommended to Stay Hydrated (At least a gallon/day)\par \par Asthma and COVID19:\par You need to make sure your asthma is under control. This often requires the use of inhaled corticosteroids (and sometimes oral corticosteroids). Inhaled corticosteroids do not likely reduce your immune system’s ability to fight infections, but oral corticosteroids may. It is important to use the steps above to protect yourself to limit your exposure to any respiratory virus. \par \par problem 10:  health maintenance \par -Recommend Mouth Kote\par -Recommended to take alpha lipoic acid and L-glutamine\par -half of flu shot (10/11/2021) \par -Pneumovax 23 given March 2018/ [id number] 11/2016\par -she is being added CoQ-10 at 200 mg QD \par -recommended early intervention for URIs\par -recommended regular osteoporosis evaluations\par -recommended early dermatological evaluations\par -recommended after the age of 50 to the age of 70, colonoscopy every 5 years \par \par F/U in 4-6 months with SPI and NiOx\par She is encouraged to call with any changes, concerns, or questions.

## 2022-04-21 NOTE — ADDENDUM
[FreeTextEntry1] : Documented by Lincoln Long acting as a scribe for Dr. Dorian Rojas on 04/21/2022.\par \par All medical record entries made by the Scribe were at my, Dr. Dorian Rojas's, direction and personally dictated by me on 04/21/2022. I have reviewed the chart and agree that the record accurately reflects my personal performance of the history, physical exam, assessment and plan. I have also personally directed, reviewed, and agree with the discharge instructions.

## 2022-04-21 NOTE — PROCEDURE
[FreeTextEntry1] : PFT revealed very mild obstructive dysfunction, with a FEV1 of 1.79L, which is 82% of predicted, with a normal flow volume loop

## 2022-04-21 NOTE — PHYSICAL EXAM

## 2022-07-27 NOTE — H&P PST ADULT - ALLERGY TYPES
Have You Had Laser Resurfacing Before?: has had previous treatments When Was Your Last Laser Resurfacing Treatment?: 06/22/22 outdoor environmental allergies/indoor environmental allergies/reactions to medicines

## 2022-10-19 ENCOUNTER — APPOINTMENT (OUTPATIENT)
Dept: PULMONOLOGY | Facility: CLINIC | Age: 72
End: 2022-10-19

## 2022-10-19 ENCOUNTER — MED ADMIN CHARGE (OUTPATIENT)
Age: 72
End: 2022-10-19

## 2022-10-19 PROCEDURE — 90682 RIV4 VACC RECOMBINANT DNA IM: CPT

## 2022-10-19 PROCEDURE — G0008: CPT

## 2022-10-26 ENCOUNTER — APPOINTMENT (OUTPATIENT)
Dept: PULMONOLOGY | Facility: CLINIC | Age: 72
End: 2022-10-26

## 2022-10-26 PROCEDURE — 90682 RIV4 VACC RECOMBINANT DNA IM: CPT | Mod: NC

## 2022-10-26 PROCEDURE — G0008: CPT

## 2023-02-15 ENCOUNTER — APPOINTMENT (OUTPATIENT)
Dept: PULMONOLOGY | Facility: CLINIC | Age: 73
End: 2023-02-15
Payer: MEDICARE

## 2023-02-15 ENCOUNTER — NON-APPOINTMENT (OUTPATIENT)
Age: 73
End: 2023-02-15

## 2023-02-15 VITALS
SYSTOLIC BLOOD PRESSURE: 140 MMHG | BODY MASS INDEX: 25.1 KG/M2 | DIASTOLIC BLOOD PRESSURE: 80 MMHG | WEIGHT: 147 LBS | TEMPERATURE: 98 F | HEIGHT: 64 IN | OXYGEN SATURATION: 97 % | HEART RATE: 70 BPM | RESPIRATION RATE: 16 BRPM

## 2023-02-15 DIAGNOSIS — U07.1 COVID-19: ICD-10-CM

## 2023-02-15 PROCEDURE — 95012 NITRIC OXIDE EXP GAS DETER: CPT

## 2023-02-15 PROCEDURE — 94010 BREATHING CAPACITY TEST: CPT

## 2023-02-15 PROCEDURE — 99214 OFFICE O/P EST MOD 30 MIN: CPT | Mod: 25

## 2023-02-15 RX ORDER — LEVOCETIRIZINE DIHYDROCHLORIDE 5 MG/1
5 TABLET ORAL
Qty: 90 | Refills: 1 | Status: ACTIVE | COMMUNITY
Start: 2020-07-02 | End: 1900-01-01

## 2023-02-15 NOTE — HISTORY OF PRESENT ILLNESS
[FreeTextEntry1] : Ms. Henson is a 72 year old female with a history of allergic rhinitis, elevated IgE, eosinophilic asthma, severe persistent asthma, and SOB presents into the office today for a follow up visit. Her chief complaint \par -she notes getting covid-19 \par -she notes wearing mask all the time \par -she notes not happy about her weight \par -she notes stable breathing \par -she notes getting past the discomfort of the covid-19 \par -she denies any headaches, nausea, emesis, fever, chills, sweats, chest pain, chest pressure, coughing, wheezing, palpitations, constipation, diarrhea, vertigo, dysphagia, heartburn, reflux, itchy eyes, itchy ears, leg swelling, leg pain, arthralgias, myalgias, or sour taste in the mouth.

## 2023-02-15 NOTE — ADDENDUM
[FreeTextEntry1] : Documented by Sebas Aldana acting as a scribe for Dr. Dorian Rojas on 02/15/2023 .\par \par All medical record entries made by the Scribe were at my, Dr. Dorian Rojas's, direction and personally dictated by me on 02/15/2023. I have reviewed the chart and agree that the record accurately reflects my personal performance of the history, physical exam, assessment and plan. I have also personally directed, reviewed, and agree with the discharge instructions.

## 2023-02-15 NOTE — PROCEDURE
[FreeTextEntry1] : PFT reveals normal flows, with an FEV1 of  1.66L, which is 77% of predicted, with a normal flow volume loop. \par \par \par FENO was 18 ; a normal value being less than 25\par Fractional exhaled nitric oxide (FENO) is regarded as a simple, noninvasive method for assessing eosinophilic airway inflammation. Produced by a variety of cells within the lung, nitric oxide (NO) concentrations are generally low in healthy individuals. However, high concentrations of NO appear to be involved in nonspecific host defense mechanisms and chronic inflammatory diseases such as asthma. The American Thor[employer] Society (ATS) therefore has recommended using FENO to aid in the diagnosis and monitoring of eosinophilic airway inflammation and asthma, and for identifying steroid responsive individuals whose chronic respiratory symptoms may be caused by airway inflammation.

## 2023-02-15 NOTE — ASSESSMENT
[FreeTextEntry1] : Ms. Henson is 72 y.o F who has a history of eosinophilic asthma, elevated IgE, allergies, HLD covid-19 12/2022, and anxiety. She is doing well from a pulmonary perspective. Her number one issue is her weight \par \par Her shortness of breath is multifactorial due to:\par -poor mechanics of breathing \par -out of shape\par -pulmonary disease - asthma\par \par problem 1: moderate persistent asthma (stable) \par -continue to use Advair 250 at 1 inhalation BID or Wixela 250 mg 1 inhalation BID\par -continue to use Spiriva 2 inhalations QD\par -continue to use Singulair 10 mg before bed\par \par -Asthma is believed to be caused by inherited (genetic) and environmental factor, but its exact cause is unknown. Asthma may be triggered by allergens, lung infections, or irritants in the air. Asthma triggers are different for each person\par -Inhaler technique reviewed as well as oral hygiene techniques reviewed with patient. Avoidance of cold air, extremes of temperature, rescue inhaler should be used before exercise. Order of medication reviewed with patient. Recommended use of a cool mist humidifier in the bedroom.\par \par problem 2: elevated IgE level and eosinophilia\par -she remains a candidate for Xolair and Nucala, respectfully \par -no need to implement at this point in time\par \par -The safety and efficacy of Nucala was established in three double-blind, randomized, placebo-controlled trials in patients with severe asthma. Compared to a placebo, patients with severe asthma receiving Nucala had fewer exacerbation requiring hospitalization and/or emergency department visits, and a longer time to first exacerbation. In addition, patients with severe asthma receiving Nucala experienced greater reductions in their daily maintenance oral corticosteroid dose, while maintaining asthma control compared with patients receiving placebo. Treatment with Nucala did not result in a significant improvement in lung function, as measured by the volume of air exhaled by patients in one second. The most common side effects include: headache, injection site reactions, back pain, weakness, and fatigue; hypersensitivity reactions can occur within hours or days including swelling of the face, mouth, and tongue, fainting, dizziness, hives, breathing problems, and rash; herpes zoster infections have occurred. The drug is a monoclonal antibody that inhibits interleukin -5 which helps regular eosinophils, a type of white blood cell that contributes to asthma. The over-production of eosinophils can cause inflammation in the lungs, increasing the frequency of asthma attacks. Patients must also take other medications, including high dose inhaled corticosteroids and at least one additional asthma drug.\par -Xolair is a recombinant DNA- derived humanized IgG1K monoclonal antibody that selectively binds ot human immunoglobulin E (IgE). Xolair is produced by a Chinese hamster ovary cell suspension culture in nutrient medium containing the antibiotic gentamicin. Gentamicin is not detectable in the final product. Xolair is a sterile, white, preservative free, lyophilized powder contained in a single use vial that is reconstituted with sterile water for suspension. Side effects include: wheezing, tightness of the chest, trouble breathing, hives, skin rash, feeling anxious or light-headed, fainting, warmth or tingling under skin, or swelling of face, lips, or tongue \par \par problem 3: allergic rhinitis (stable)\par -add Allegra 180 mg QAM \par -continue to use Clarinex 5 mg QHS\par -recommended to use Xlear nasal saline spray \par -continue Astelin 0.15% BID\par -Environmental measures for allergies were encouraged including mattress and pillow cover, air purifier, and environmental controls. \par \par problem 4: URI (if present)\par -recommended SaNOtize nasal spray \par -recommended to take Aida seltzer cold and sinus\par \par problem 5: poor sleep (improved)\par Good sleep hygiene was encouraged including avoiding watching television an hour before bed, keeping caffeine at a low, avoiding reading, television, or anything, in bed, no drinking any liquids three hours before bedtime, and only getting into bed when tired and ready for sleep. \par \par problem 6: poor breathing mechanics\par -Recommended Wim Hof and Buteyko breathing techniques \par -Proper breathing techniques were reviewed with an emphasis of exhalation. Patient instructed to breath in for 1 second and out for four seconds. Patient was encouraged to not talk while walking. \par \par problem 7: out of shape\par -Weight loss, exercise, and diet control were discussed and are highly encouraged. Treatment options were given such as, aqua therapy, and contacting a nutritionist. Recommended to use the elliptical, stationary bike, less use of treadmill. Mindful eating was explained to the patient Obesity is associated with worsening asthma, shortness of breath, and potential for cardiac disease, diabetes, and other underlying medical conditions\par \par problem 8: anxiety\par -recommended to read: "The Gift of Maybe," by Marta Gonzalez \par \par Problem 9: Health Maintenance/COVID19 Precautions:\par -Covid-19 12/2022\par - S/p Covid 19 vaccine (Moderna) x3\par - Clean your hands often. Wash your hands often with soap and water for at least 20 seconds, especially after blowing your nose, coughing, or sneezing, or having been in a public place.\par - If soap and water are not available, use a hand  that contains at least 60% alcohol.\par - To the extent possible, avoid touching high-touch surfaces in public places - elevator buttons, door handles, handrails, handshaking with people, etc. Use a tissue or your sleeve to cover your hand or finger if you must touch something.\par - Wash your hands after touching surfaces in public places.\par - Avoid touching your face, nose, eyes, etc.\par - Clean and disinfect your home to remove germs: practice routine cleaning of frequently touched surfaces (for example: tables, doorknobs, light switches, handles, desks, toilets, faucets, sinks & cell phones)\par - Avoid crowds, especially in poorly ventilated spaces. Your risk of exposure to respiratory viruses like COVID-19 may increase in crowded, closed-in settings with little air circulation if\par there are people in the crowd who are sick. All patients are recommended to practice social distancing and stay at least 6 feet away from others.\par - Avoid all non-essential travel including plane trips, and especially avoid embarking on cruise ships.\par -If COVID-19 is spreading in your community, take extra measures to put distance between yourself and other people to further reduce your risk of being exposed to this new virus.\par -Stay home as much as possible.\par - Consider ways of getting food brought to your house through family, social, or commercial networks\par -Be aware that the virus has been known to live in the air up to 3 hours post exposure, cardboard up to 24 hours post exposure, copper up to 4 hours post exposure, steel and plastic up to 2-3 days post exposure. Risk of transmission from these surfaces are affected by many variables.\par \par Immune Support Recommendations:\par -OTC Vitamin C 500mg BID \par -OTC Quercetin 250-500mg BID \par -OTC Zinc 75-100mg per day \par -OTC Melatonin 1or 2mg a night \par -OTC Vitamin D 1-4000mg per day\par -Tonic Water 8oz\par -Recommended to Stay Hydrated (At least a gallon/day)\par \par Asthma and COVID19:\par You need to make sure your asthma is under control. This often requires the use of inhaled corticosteroids (and sometimes oral corticosteroids). Inhaled corticosteroids do not likely reduce your immune system’s ability to fight infections, but oral corticosteroids may. It is important to use the steps above to protect yourself to limit your exposure to any respiratory virus. \par \par problem 10:  health maintenance \par -Recommend Mouth Kote\par -Recommended to take alpha lipoic acid and L-glutamine\par -half of flu shot (10/11/2021) \par -Pneumovax 23 given March 2018/ [id number] 11/2016\par -she is being added CoQ-10 at 200 mg QD \par -recommended early intervention for URIs\par -recommended regular osteoporosis evaluations\par -recommended early dermatological evaluations\par -recommended after the age of 50 to the age of 70, colonoscopy every 5 years \par \par F/U in 4-6 months with SPI and NiOx\par She is encouraged to call with any changes, concerns, or questions.

## 2023-08-09 ENCOUNTER — APPOINTMENT (OUTPATIENT)
Dept: PULMONOLOGY | Facility: CLINIC | Age: 73
End: 2023-08-09
Payer: MEDICARE

## 2023-08-09 VITALS
BODY MASS INDEX: 24.49 KG/M2 | HEART RATE: 74 BPM | TEMPERATURE: 96 F | RESPIRATION RATE: 16 BRPM | WEIGHT: 147 LBS | SYSTOLIC BLOOD PRESSURE: 132 MMHG | HEIGHT: 65 IN | OXYGEN SATURATION: 97 % | DIASTOLIC BLOOD PRESSURE: 80 MMHG

## 2023-08-09 DIAGNOSIS — Z86.2 PERSONAL HISTORY OF DISEASES OF THE BLOOD AND BLOOD-FORMING ORGANS AND CERTAIN DISORDERS INVOLVING THE IMMUNE MECHANISM: ICD-10-CM

## 2023-08-09 DIAGNOSIS — J82.83 EOSINOPHILIC ASTHMA: ICD-10-CM

## 2023-08-09 PROCEDURE — 94010 BREATHING CAPACITY TEST: CPT

## 2023-08-09 PROCEDURE — 94727 GAS DIL/WSHOT DETER LNG VOL: CPT

## 2023-08-09 PROCEDURE — 99214 OFFICE O/P EST MOD 30 MIN: CPT | Mod: 25

## 2023-08-09 PROCEDURE — 94729 DIFFUSING CAPACITY: CPT

## 2023-08-09 PROCEDURE — 95012 NITRIC OXIDE EXP GAS DETER: CPT

## 2023-08-09 NOTE — HISTORY OF PRESENT ILLNESS
[FreeTextEntry1] : Ms. Henson is a 72 year old female with a history of allergic rhinitis, elevated IgE, eosinophilic asthma, severe persistent asthma, and SOB presents into the office today for a follow up visit. Her chief complaint  -she notes feeling generally well  -she notes s/p GI virus - she notes constipation  - she notes ankle weakness  -she notes weight is stable  -she notes diet is good  -she notes appetite is stable  -she notes sleeping for 6-7 hours -she notes energy levels are good (7/10) -she notes being bored  -she notes breathing is good    -she denies any headaches, nausea, emesis, fever, chills, sweats, chest pain, chest pressure, coughing, wheezing, palpitations, diarrhea, dysphagia, vertigo, myalgias, leg swelling, itchy eyes, itchy ears, heartburn, reflux, or sour taste in the mouth.

## 2023-08-09 NOTE — ASSESSMENT
[FreeTextEntry1] : Ms. Henson is 72 y.o F who has a history of eosinophilic asthma, elevated IgE, allergies, HLD covid-19 12/2022, and anxiety. She is doing well from a pulmonary perspective. Her number one issue is her weight (still)  Her shortness of breath is multifactorial due to: -poor mechanics of breathing  -out of shape -pulmonary disease - asthma  problem 1: moderate persistent asthma (stable)  -continue to use Advair 250 at 1 inhalation BID or Wixela 250 mg 1 inhalation BID -continue to use Spiriva 2 inhalations QD -continue to use Singulair 10 mg before bed  -Asthma is believed to be caused by inherited (genetic) and environmental factor, but its exact cause is unknown. Asthma may be triggered by allergens, lung infections, or irritants in the air. Asthma triggers are different for each person -Inhaler technique reviewed as well as oral hygiene techniques reviewed with patient. Avoidance of cold air, extremes of temperature, rescue inhaler should be used before exercise. Order of medication reviewed with patient. Recommended use of a cool mist humidifier in the bedroom.  problem 2: elevated IgE level and eosinophilia -she remains a candidate for Xolair and Nucala, respectfully  -no need to implement at this point in time  -The safety and efficacy of Nucala was established in three double-blind, randomized, placebo-controlled trials in patients with severe asthma. Compared to a placebo, patients with severe asthma receiving Nucala had fewer exacerbation requiring hospitalization and/or emergency department visits, and a longer time to first exacerbation. In addition, patients with severe asthma receiving Nucala experienced greater reductions in their daily maintenance oral corticosteroid dose, while maintaining asthma control compared with patients receiving placebo. Treatment with Nucala did not result in a significant improvement in lung function, as measured by the volume of air exhaled by patients in one second. The most common side effects include: headache, injection site reactions, back pain, weakness, and fatigue; hypersensitivity reactions can occur within hours or days including swelling of the face, mouth, and tongue, fainting, dizziness, hives, breathing problems, and rash; herpes zoster infections have occurred. The drug is a monoclonal antibody that inhibits interleukin -5 which helps regular eosinophils, a type of white blood cell that contributes to asthma. The over-production of eosinophils can cause inflammation in the lungs, increasing the frequency of asthma attacks. Patients must also take other medications, including high dose inhaled corticosteroids and at least one additional asthma drug. -Xolair is a recombinant DNA- derived humanized IgG1K monoclonal antibody that selectively binds ot human immunoglobulin E (IgE). Xolair is produced by a Chinese hamster ovary cell suspension culture in nutrient medium containing the antibiotic gentamicin. Gentamicin is not detectable in the final product. Xolair is a sterile, white, preservative free, lyophilized powder contained in a single use vial that is reconstituted with sterile water for suspension. Side effects include: wheezing, tightness of the chest, trouble breathing, hives, skin rash, feeling anxious or light-headed, fainting, warmth or tingling under skin, or swelling of face, lips, or tongue   problem 3: allergic rhinitis (stable) -add Allegra 180 mg QAM  -continue to use Clarinex 5 mg QHS -recommended to use Xlear nasal saline spray  -continue Astelin 0.15% BID -Environmental measures for allergies were encouraged including mattress and pillow cover, air purifier, and environmental controls.   problem 4: URI (if present) -recommended SaNOtize nasal spray  -recommended to take Aida seltzer cold and sinus  problem 5: poor sleep (improved) Good sleep hygiene was encouraged including avoiding watching television an hour before bed, keeping caffeine at a low, avoiding reading, television, or anything, in bed, no drinking any liquids three hours before bedtime, and only getting into bed when tired and ready for sleep.   problem 6: poor breathing mechanics -Recommended [place] and Buteyko breathing techniques  -Proper breathing techniques were reviewed with an emphasis of exhalation. Patient instructed to breath in for 1 second and out for four seconds. Patient was encouraged to not talk while walking.   problem 7: out of shape -Weight loss, exercise, and diet control were discussed and are highly encouraged. Treatment options were given such as, aqua therapy, and contacting a nutritionist. Recommended to use the elliptical, stationary bike, less use of treadmill. Mindful eating was explained to the patient Obesity is associated with worsening asthma, shortness of breath, and potential for cardiac disease, diabetes, and other underlying medical conditions  problem 8: anxiety -recommended to read: "The Gift of Maybe," by Marta Gonzalez   Problem 9: Health Maintenance/COVID19 Precautions: -recommended RSV vaccine  -Covid-19 12/2022 - S/p Covid 19 vaccine (Moderna) x3 - Clean your hands often. Wash your hands often with soap and water for at least 20 seconds, especially after blowing your nose, coughing, or sneezing, or having been in a public place. - If soap and water are not available, use a hand  that contains at least 60% alcohol. - To the extent possible, avoid touching high-touch surfaces in public places - elevator buttons, door handles, handrails, handshaking with people, etc. Use a tissue or your sleeve to cover your hand or finger if you must touch something. - Wash your hands after touching surfaces in public places. - Avoid touching your face, nose, eyes, etc. - Clean and disinfect your home to remove germs: practice routine cleaning of frequently touched surfaces (for example: tables, doorknobs, light switches, handles, desks, toilets, faucets, sinks & cell phones) - Avoid crowds, especially in poorly ventilated spaces. Your risk of exposure to respiratory viruses like COVID-19 may increase in crowded, closed-in settings with little air circulation if there are people in the crowd who are sick. All patients are recommended to practice social distancing and stay at least 6 feet away from others. - Avoid all non-essential travel including plane trips, and especially avoid embarking on cruise ships. -If COVID-19 is spreading in your community, take extra measures to put distance between yourself and other people to further reduce your risk of being exposed to this new virus. -Stay home as much as possible. - Consider ways of getting food brought to your house through family, social, or commercial networks -Be aware that the virus has been known to live in the air up to 3 hours post exposure, cardboard up to 24 hours post exposure, copper up to 4 hours post exposure, steel and plastic up to 2-3 days post exposure. Risk of transmission from these surfaces are affected by many variables.  Immune Support Recommendations: -OTC Vitamin C 500mg BID  -OTC Quercetin 250-500mg BID  -OTC Zinc 75-100mg per day  -OTC Melatonin 1or 2mg a night  -OTC Vitamin D 1-4000mg per day -Tonic Water 8oz -Recommended to Stay Hydrated (At least a gallon/day)  Asthma and COVID19: You need to make sure your asthma is under control. This often requires the use of inhaled corticosteroids (and sometimes oral corticosteroids). Inhaled corticosteroids do not likely reduce your immune system's ability to fight infections, but oral corticosteroids may. It is important to use the steps above to protect yourself to limit your exposure to any respiratory virus.   problem 10:  health maintenance  -Recommend Mouth Kote -Recommended to take alpha lipoic acid and L-glutamine -half of flu shot (10/11/2021)  -Pneumovax 23 given March 2018/ [id number] 11/2016 -she is being added CoQ-10 at 200 mg QD  -recommended early intervention for URIs -recommended regular osteoporosis evaluations -recommended early dermatological evaluations -recommended after the age of 50 to the age of 70, colonoscopy every 5 years   F/U in 4-6 months with SPI and NiOx She is encouraged to call with any changes, concerns, or questions.

## 2023-08-09 NOTE — ADDENDUM
[FreeTextEntry1] : Documented by Rory White acting as a scribe for Dr. Dorian Rojas on 08/09/2023. All medical record entries made by the Scribe were at my, Dr. Dorian Rojas's, direction and personally dictated by me on 08/09/2023. I have reviewed the chart and agree that the record accurately reflects my personal performance of the history, physical exam, assessment and plan. I have also personally directed, reviewed, and agree with the discharge instructions.

## 2023-08-09 NOTE — PROCEDURE
[FreeTextEntry1] : Full PFT reveals normal flows; FEV1 was  2.12L which is 102% of predicted; normal lung volumes; normal diffusion at 17.2, which is 92% of predicted; normal flow volume loop. PFTs were performed to evaluate for SOB  FENO was 16 ; a normal value being less than 25 Fractional exhaled nitric oxide (FENO) is regarded as a simple, noninvasive method for assessing eosinophilic airway inflammation. Produced by a variety of cells within the lung, nitric oxide (NO) concentrations are generally low in healthy individuals. However, high concentrations of NO appear to be involved in nonspecific host defense mechanisms and chronic inflammatory diseases such as asthma. The American Thoracic Society (ATS) therefore has recommended using FENO to aid in the diagnosis and monitoring of eosinophilic airway inflammation and asthma, and for identifying steroid responsive individuals whose chronic respiratory symptoms may be caused by airway inflammation.

## 2023-08-21 ENCOUNTER — RX RENEWAL (OUTPATIENT)
Age: 73
End: 2023-08-21

## 2023-10-11 ENCOUNTER — APPOINTMENT (OUTPATIENT)
Dept: PULMONOLOGY | Facility: CLINIC | Age: 73
End: 2023-10-11
Payer: MEDICARE

## 2023-10-11 PROCEDURE — G0008: CPT

## 2023-10-11 PROCEDURE — 90682 RIV4 VACC RECOMBINANT DNA IM: CPT

## 2023-10-18 ENCOUNTER — APPOINTMENT (OUTPATIENT)
Dept: PULMONOLOGY | Facility: CLINIC | Age: 73
End: 2023-10-18

## 2023-10-18 ENCOUNTER — NON-APPOINTMENT (OUTPATIENT)
Age: 73
End: 2023-10-18

## 2024-01-03 ENCOUNTER — APPOINTMENT (OUTPATIENT)
Dept: OBGYN | Facility: CLINIC | Age: 74
End: 2024-01-03

## 2024-01-31 ENCOUNTER — OFFICE (OUTPATIENT)
Dept: URBAN - METROPOLITAN AREA CLINIC 90 | Facility: CLINIC | Age: 74
Setting detail: OPHTHALMOLOGY
End: 2024-01-31
Payer: COMMERCIAL

## 2024-01-31 DIAGNOSIS — H52.7: ICD-10-CM

## 2024-01-31 DIAGNOSIS — H25.13: ICD-10-CM

## 2024-01-31 DIAGNOSIS — H40.013: ICD-10-CM

## 2024-01-31 PROCEDURE — 92133 CPTRZD OPH DX IMG PST SGM ON: CPT | Performed by: OPHTHALMOLOGY

## 2024-01-31 PROCEDURE — 92083 EXTENDED VISUAL FIELD XM: CPT | Performed by: OPHTHALMOLOGY

## 2024-01-31 PROCEDURE — 92014 COMPRE OPH EXAM EST PT 1/>: CPT | Performed by: OPHTHALMOLOGY

## 2024-01-31 ASSESSMENT — REFRACTION_MANIFEST
OS_CYLINDER: SPH
OD_VA1: 20/30
OS_SPHERE: +2.00
OD_CYLINDER: SPH
OD_ADD: +2.50
OD_ADD: +2.50
OD_SPHERE: +1.75
OS_CYLINDER: SPH
OD_SPHERE: +1.75
OS_VA1: 20/25
OS_ADD: +2.50
OS_VA1: 20/25
OS_ADD: +2.50
OD_VA1: 20/30
OD_CYLINDER: SPH
OS_SPHERE: +2.00

## 2024-01-31 ASSESSMENT — REFRACTION_CURRENTRX
OS_OVR_VA: 20/
OS_AXIS: 000
OD_OVR_VA: 20/
OS_CYLINDER: SPH
OS_ADD: +2.50
OS_CYLINDER: SPHERE
OD_SPHERE: +1.75
OD_VPRISM_DIRECTION: PROGS
OS_SPHERE: +2.00
OS_OVR_VA: 20/
OD_AXIS: 000
OD_ADD: +2.50
OD_ADD: +2.50
OD_VPRISM_DIRECTION: PROGS
OD_SPHERE: +1.75
OS_VPRISM_DIRECTION: PROGS
OD_OVR_VA: 20/
OD_CYLINDER: SPHERE
OD_CYLINDER: SPH
OS_ADD: +2.50
OS_VPRISM_DIRECTION: PROGS
OS_SPHERE: +2.00

## 2024-01-31 ASSESSMENT — CONFRONTATIONAL VISUAL FIELD TEST (CVF)
OD_FINDINGS: FULL
OS_FINDINGS: FULL

## 2024-02-07 ENCOUNTER — APPOINTMENT (OUTPATIENT)
Dept: PULMONOLOGY | Facility: CLINIC | Age: 74
End: 2024-02-07
Payer: MEDICARE

## 2024-02-07 VITALS
TEMPERATURE: 97.5 F | HEIGHT: 65 IN | SYSTOLIC BLOOD PRESSURE: 123 MMHG | RESPIRATION RATE: 18 BRPM | WEIGHT: 147 LBS | BODY MASS INDEX: 24.49 KG/M2 | DIASTOLIC BLOOD PRESSURE: 73 MMHG | HEART RATE: 73 BPM | OXYGEN SATURATION: 98 %

## 2024-02-07 DIAGNOSIS — J30.9 ALLERGIC RHINITIS, UNSPECIFIED: ICD-10-CM

## 2024-02-07 DIAGNOSIS — J45.50 SEVERE PERSISTENT ASTHMA, UNCOMPLICATED: ICD-10-CM

## 2024-02-07 DIAGNOSIS — R06.02 SHORTNESS OF BREATH: ICD-10-CM

## 2024-02-07 DIAGNOSIS — R76.8 OTHER SPECIFIED ABNORMAL IMMUNOLOGICAL FINDINGS IN SERUM: ICD-10-CM

## 2024-02-07 PROCEDURE — 94010 BREATHING CAPACITY TEST: CPT

## 2024-02-07 PROCEDURE — 95012 NITRIC OXIDE EXP GAS DETER: CPT

## 2024-02-07 PROCEDURE — 99214 OFFICE O/P EST MOD 30 MIN: CPT | Mod: 25

## 2024-02-07 NOTE — PROCEDURE
[FreeTextEntry1] : PFTs revealed normal flows; FEV1 was 1.73L, which is 78.3% of predicted; normal flow volume loop. PFTs were performed to evaluate for SOB  FENO was 15; a normal value being less than 25 Fractional exhaled nitric oxide (FENO) is regarded as a simple, noninvasive method for assessing eosinophilic airway inflammation. Produced by a variety of cells within the lung, nitric oxide (NO) concentrations are generally low in healthy individuals. However, high concentrations of NO appear to be involved in nonspecific host defense mechanisms and chronic inflammatory diseases such as asthma. The American Thoracic Society (ATS) therefore has recommended using FENO to aid in the diagnosis and monitoring of eosinophilic airway inflammation and asthma, and for identifying steroid responsive individuals whose chronic respiratory symptoms may be caused by airway inflammation.

## 2024-02-07 NOTE — HISTORY OF PRESENT ILLNESS
[FreeTextEntry1] : Ms. eHnson is a 73 year old female with a history of allergic rhinitis, elevated IgE, eosinophilic asthma, severe persistent asthma, and SOB presents into the office today for a follow-up pulmonary evaluation. Her chief complaint is  -she notes she had a URI, which triggered a lingering cough. Her Sx started with dysphonia. She denies any coughing at this time -she notes dealing with constipation -she notes she sometimes gets enough sleep, depending on her caffeine intake -she notes exercising (stationary bike 2-3X per week) -she notes balance is stable  -she notes weight is stable   -she denies any headaches, nausea, emesis, fever, chills, sweats, chest pain, chest pressure, wheezing, palpitations, diarrhea, dysphagia, vertigo, arthralgias, myalgias, leg swelling, itchy eyes, itchy ears, heartburn, reflux, or sour taste in the mouth.

## 2024-02-07 NOTE — ASSESSMENT
[FreeTextEntry1] : Ms. Henson is 72 y.o F who has a history of eosinophilic asthma, elevated IgE, allergies, HLD covid-19 12/2022, and anxiety. She is doing well from a pulmonary perspective. Her number one issue is her weight (still), s/p URI, bronchospasm 12/2023  Her shortness of breath is multifactorial due to: -poor mechanics of breathing -out of shape -pulmonary disease - asthma  problem 1: moderate persistent asthma (stable) -continue to use Advair 250 at 1 inhalation BID or Wixela 250 mg 1 inhalation BID -continue to use Spiriva 2 inhalations QD -continue to use Singulair 10 mg before bed  -Asthma is believed to be caused by inherited (genetic) and environmental factor, but its exact cause is unknown. Asthma may be triggered by allergens, lung infections, or irritants in the air. Asthma triggers are different for each person -Inhaler technique reviewed as well as oral hygiene techniques reviewed with patient. Avoidance of cold air, extremes of temperature, rescue inhaler should be used before exercise. Order of medication reviewed with patient. Recommended use of a cool mist humidifier in the bedroom.  problem 2: elevated IgE level and eosinophilia -she remains a candidate for Xolair and Nucala, respectfully -no need to implement at this point in time  -The safety and efficacy of Nucala was established in three double-blind, randomized, placebo-controlled trials in patients with severe asthma. Compared to a placebo, patients with severe asthma receiving Nucala had fewer exacerbation requiring hospitalization and/or emergency department visits, and a longer time to first exacerbation. In addition, patients with severe asthma receiving Nucala experienced greater reductions in their daily maintenance oral corticosteroid dose, while maintaining asthma control compared with patients receiving placebo. Treatment with Nucala did not result in a significant improvement in lung function, as measured by the volume of air exhaled by patients in one second. The most common side effects include: headache, injection site reactions, back pain, weakness, and fatigue; hypersensitivity reactions can occur within hours or days including swelling of the face, mouth, and tongue, fainting, dizziness, hives, breathing problems, and rash; herpes zoster infections have occurred. The drug is a monoclonal antibody that inhibits interleukin -5 which helps regular eosinophils, a type of white blood cell that contributes to asthma. The over-production of eosinophils can cause inflammation in the lungs, increasing the frequency of asthma attacks. Patients must also take other medications, including high dose inhaled corticosteroids and at least one additional asthma drug. -Xolair is a recombinant DNA- derived humanized IgG1K monoclonal antibody that selectively binds ot human immunoglobulin E (IgE). Xolair is produced by a Chinese hamster ovary cell suspension culture in nutrient medium containing the antibiotic gentamicin. Gentamicin is not detectable in the final product. Xolair is a sterile, white, preservative free, lyophilized powder contained in a single use vial that is reconstituted with sterile water for suspension. Side effects include: wheezing, tightness of the chest, trouble breathing, hives, skin rash, feeling anxious or light-headed, fainting, warmth or tingling under skin, or swelling of face, lips, or tongue  problem 3: allergic rhinitis (stable) -add Allegra 180 mg QAM -continue to use Clarinex 5 mg QHS -recommended to use Xlear nasal saline spray -continue Astelin 0.15% BID -Environmental measures for allergies were encouraged including mattress and pillow cover, air purifier, and environmental controls.  problem 4: URI (if present) -recommended SaNOtize nasal spray -recommended to take Aida seltzer cold and sinus  problem 5: poor sleep (improved) Good sleep hygiene was encouraged including avoiding watching television an hour before bed, keeping caffeine at a low, avoiding reading, television, or anything, in bed, no drinking any liquids three hours before bedtime, and only getting into bed when tired and ready for sleep.  problem 6: poor breathing mechanics -Recommended Ginna Popef and Buteyko breathing techniques -Proper breathing techniques were reviewed with an emphasis of exhalation. Patient instructed to breath in for 1 second and out for four seconds. Patient was encouraged to not talk while walking.  problem 7: out of shape -Weight loss, exercise, and diet control were discussed and are highly encouraged. Treatment options were given such as, aqua therapy, and contacting a nutritionist. Recommended to use the elliptical, stationary bike, less use of treadmill. Mindful eating was explained to the patient Obesity is associated with worsening asthma, shortness of breath, and potential for cardiac disease, diabetes, and other underlying medical conditions  problem 8: anxiety -recommended to read: "The Gift of Maybe," by Marta Gonzalez  Problem 9: Health Maintenance/COVID19 Precautions: -recommended RSV vaccine -Covid-19 12/2022 - S/p Covid 19 vaccine (Moderna) x3 - Clean your hands often. Wash your hands often with soap and water for at least 20 seconds, especially after blowing your nose, coughing, or sneezing, or having been in a public place. - If soap and water are not available, use a hand  that contains at least 60% alcohol. - To the extent possible, avoid touching high-touch surfaces in public places - elevator buttons, door handles, handrails, handshaking with people, etc. Use a tissue or your sleeve to cover your hand or finger if you must touch something. - Wash your hands after touching surfaces in public places. - Avoid touching your face, nose, eyes, etc. - Clean and disinfect your home to remove germs: practice routine cleaning of frequently touched surfaces (for example: tables, doorknobs, light switches, handles, desks, toilets, faucets, sinks & cell phones) - Avoid crowds, especially in poorly ventilated spaces. Your risk of exposure to respiratory viruses like COVID-19 may increase in crowded, closed-in settings with little air circulation if there are people in the crowd who are sick. All patients are recommended to practice social distancing and stay at least 6 feet away from others. - Avoid all non-essential travel including plane trips, and especially avoid embarking on cruise ships. -If COVID-19 is spreading in your community, take extra measures to put distance between yourself and other people to further reduce your risk of being exposed to this new virus. -Stay home as much as possible. - Consider ways of getting food brought to your house through family, social, or commercial networks -Be aware that the virus has been known to live in the air up to 3 hours post exposure, cardboard up to 24 hours post exposure, copper up to 4 hours post exposure, steel and plastic up to 2-3 days post exposure. Risk of transmission from these surfaces are affected by many variables.  Immune Support Recommendations: -OTC Vitamin C 500mg BID -OTC Quercetin 250-500mg BID -OTC Zinc 75-100mg per day -OTC Melatonin 1or 2mg a night -OTC Vitamin D 1-4000mg per day -Tonic Water 8oz -Recommended to Stay Hydrated (At least a gallon/day)  Asthma and COVID19: You need to make sure your asthma is under control. This often requires the use of inhaled corticosteroids (and sometimes oral corticosteroids). Inhaled corticosteroids do not likely reduce your immune system's ability to fight infections, but oral corticosteroids may. It is important to use the steps above to protect yourself to limit your exposure to any respiratory virus.  problem 10: health maintenance -Recommend Mouth Kote -Recommended to take alpha lipoic acid and L-glutamine -s/p Shingrix vaccines x2 (completed 2023) -s/p yearly flu shot given in office 2023 -Pneumovax 23 given March 2018/ [id number] 11/2016 -she is being added CoQ-10 at 200 mg QD -recommended early intervention for URIs -recommended regular osteoporosis evaluations -recommended early dermatological evaluations -recommended after the age of 50 to the age of 70, colonoscopy every 5 years  F/P in 4-6 months with SPI and NiOx She is encouraged to call with any changes, concerns, or questions.

## 2024-02-07 NOTE — ADDENDUM
[FreeTextEntry1] : Documented by Wanda Phelps acting as a scribe for Dr. Dorian Rojas on 02/07/2024. All medical record entries made by the Scribe were at my, Dr. Dorian Rojas's, direction and personally dictated by me on 02/07/2024. I have reviewed the chart and agree that the record accurately reflects my personal performance of the history, physical exam, assessment and plan. I have also personally directed, reviewed, and agree with the discharge instructions.

## 2024-02-07 NOTE — PHYSICAL EXAM
normal appearance , without tenderness upon palpation , no deformities , trachea midline , Thyroid normal size , no thyroid nodules , no masses , no JVD , thyroid nontender , normal appearance , without tenderness upon palpation , no deformities , trachea midline , Thyroid normal size , no thyroid nodules , no masses , no JVD , thyroid nontender [No Acute Distress] : no acute distress [Normal Oropharynx] : normal oropharynx [III] : Mallampati Class: III [Normal Appearance] : normal appearance [No Neck Mass] : no neck mass [Normal Rate/Rhythm] : normal rate/rhythm [Normal S1, S2] : normal s1, s2 [No Murmurs] : no murmurs [No Resp Distress] : no resp distress [Clear to Auscultation Bilaterally] : clear to auscultation bilaterally [Kyphosis] : kyphosis [Benign] : benign [Normal Gait] : normal gait [No Clubbing] : no clubbing [No Cyanosis] : no cyanosis [No Edema] : no edema [FROM] : FROM [Normal Color/ Pigmentation] : normal color/ pigmentation [No Focal Deficits] : no focal deficits [Oriented x3] : oriented x3 [Normal Affect] : normal affect [TextBox_68] : I:E 1:3; Clear

## 2024-03-01 ENCOUNTER — EMERGENCY (EMERGENCY)
Facility: HOSPITAL | Age: 74
LOS: 1 days | Discharge: HOME | End: 2024-03-01
Attending: EMERGENCY MEDICINE
Payer: MEDICARE

## 2024-03-01 VITALS
WEIGHT: 147.05 LBS | HEIGHT: 65 IN | DIASTOLIC BLOOD PRESSURE: 100 MMHG | OXYGEN SATURATION: 98 % | HEART RATE: 87 BPM | RESPIRATION RATE: 17 BRPM | TEMPERATURE: 98 F | SYSTOLIC BLOOD PRESSURE: 170 MMHG

## 2024-03-01 VITALS
HEART RATE: 77 BPM | OXYGEN SATURATION: 97 % | DIASTOLIC BLOOD PRESSURE: 100 MMHG | TEMPERATURE: 99 F | SYSTOLIC BLOOD PRESSURE: 155 MMHG | RESPIRATION RATE: 16 BRPM

## 2024-03-01 LAB
ALBUMIN SERPL ELPH-MCNC: 4.8 G/DL — SIGNIFICANT CHANGE UP (ref 3.3–5)
ALP SERPL-CCNC: 89 U/L — SIGNIFICANT CHANGE UP (ref 40–120)
ALT FLD-CCNC: 22 U/L — SIGNIFICANT CHANGE UP (ref 10–45)
ANION GAP SERPL CALC-SCNC: 15 MMOL/L — SIGNIFICANT CHANGE UP (ref 5–17)
APPEARANCE UR: ABNORMAL
AST SERPL-CCNC: 32 U/L — SIGNIFICANT CHANGE UP (ref 10–40)
BACTERIA # UR AUTO: NEGATIVE /HPF — SIGNIFICANT CHANGE UP
BASE EXCESS BLDV CALC-SCNC: -2.7 MMOL/L — LOW (ref -2–3)
BASE EXCESS BLDV CALC-SCNC: 1.6 MMOL/L — SIGNIFICANT CHANGE UP (ref -2–3)
BASOPHILS # BLD AUTO: 0.06 K/UL — SIGNIFICANT CHANGE UP (ref 0–0.2)
BASOPHILS NFR BLD AUTO: 0.5 % — SIGNIFICANT CHANGE UP (ref 0–2)
BILIRUB SERPL-MCNC: 1.2 MG/DL — SIGNIFICANT CHANGE UP (ref 0.2–1.2)
BILIRUB UR-MCNC: NEGATIVE — SIGNIFICANT CHANGE UP
BUN SERPL-MCNC: 22 MG/DL — SIGNIFICANT CHANGE UP (ref 7–23)
CA-I SERPL-SCNC: 1.22 MMOL/L — SIGNIFICANT CHANGE UP (ref 1.15–1.33)
CA-I SERPL-SCNC: 1.36 MMOL/L — HIGH (ref 1.15–1.33)
CALCIUM SERPL-MCNC: 11.1 MG/DL — HIGH (ref 8.4–10.5)
CAST: 8 /LPF — HIGH (ref 0–4)
CHLORIDE BLDV-SCNC: 104 MMOL/L — SIGNIFICANT CHANGE UP (ref 96–108)
CHLORIDE BLDV-SCNC: 108 MMOL/L — SIGNIFICANT CHANGE UP (ref 96–108)
CHLORIDE SERPL-SCNC: 102 MMOL/L — SIGNIFICANT CHANGE UP (ref 96–108)
CO2 BLDV-SCNC: 24 MMOL/L — SIGNIFICANT CHANGE UP (ref 22–26)
CO2 BLDV-SCNC: 29 MMOL/L — HIGH (ref 22–26)
CO2 SERPL-SCNC: 22 MMOL/L — SIGNIFICANT CHANGE UP (ref 22–31)
COD CRY URNS QL: PRESENT
COLOR SPEC: SIGNIFICANT CHANGE UP
CREAT SERPL-MCNC: 0.79 MG/DL — SIGNIFICANT CHANGE UP (ref 0.5–1.3)
DIFF PNL FLD: ABNORMAL
EGFR: 78 ML/MIN/1.73M2 — SIGNIFICANT CHANGE UP
EOSINOPHIL # BLD AUTO: 0.01 K/UL — SIGNIFICANT CHANGE UP (ref 0–0.5)
EOSINOPHIL NFR BLD AUTO: 0.1 % — SIGNIFICANT CHANGE UP (ref 0–6)
GAS PNL BLDV: 138 MMOL/L — SIGNIFICANT CHANGE UP (ref 136–145)
GAS PNL BLDV: 139 MMOL/L — SIGNIFICANT CHANGE UP (ref 136–145)
GAS PNL BLDV: SIGNIFICANT CHANGE UP
GLUCOSE BLDV-MCNC: 104 MG/DL — HIGH (ref 70–99)
GLUCOSE BLDV-MCNC: 121 MG/DL — HIGH (ref 70–99)
GLUCOSE SERPL-MCNC: 117 MG/DL — HIGH (ref 70–99)
GLUCOSE UR QL: NEGATIVE MG/DL — SIGNIFICANT CHANGE UP
HCO3 BLDV-SCNC: 23 MMOL/L — SIGNIFICANT CHANGE UP (ref 22–29)
HCO3 BLDV-SCNC: 27 MMOL/L — SIGNIFICANT CHANGE UP (ref 22–29)
HCT VFR BLD CALC: 49.7 % — HIGH (ref 34.5–45)
HCT VFR BLDA CALC: 42 % — SIGNIFICANT CHANGE UP (ref 34.5–46.5)
HCT VFR BLDA CALC: 50 % — HIGH (ref 34.5–46.5)
HGB BLD CALC-MCNC: 14 G/DL — SIGNIFICANT CHANGE UP (ref 11.7–16.1)
HGB BLD CALC-MCNC: 16.8 G/DL — HIGH (ref 11.7–16.1)
HGB BLD-MCNC: 16.6 G/DL — HIGH (ref 11.5–15.5)
IMM GRANULOCYTES NFR BLD AUTO: 0.4 % — SIGNIFICANT CHANGE UP (ref 0–0.9)
KETONES UR-MCNC: 40 MG/DL
LACTATE BLDV-MCNC: 1.8 MMOL/L — SIGNIFICANT CHANGE UP (ref 0.5–2)
LACTATE BLDV-MCNC: 2.5 MMOL/L — HIGH (ref 0.5–2)
LEUKOCYTE ESTERASE UR-ACNC: NEGATIVE — SIGNIFICANT CHANGE UP
LIDOCAIN IGE QN: 23 U/L — SIGNIFICANT CHANGE UP (ref 7–60)
LYMPHOCYTES # BLD AUTO: 1.61 K/UL — SIGNIFICANT CHANGE UP (ref 1–3.3)
LYMPHOCYTES # BLD AUTO: 14.6 % — SIGNIFICANT CHANGE UP (ref 13–44)
MCHC RBC-ENTMCNC: 30.5 PG — SIGNIFICANT CHANGE UP (ref 27–34)
MCHC RBC-ENTMCNC: 33.4 GM/DL — SIGNIFICANT CHANGE UP (ref 32–36)
MCV RBC AUTO: 91.2 FL — SIGNIFICANT CHANGE UP (ref 80–100)
MONOCYTES # BLD AUTO: 0.63 K/UL — SIGNIFICANT CHANGE UP (ref 0–0.9)
MONOCYTES NFR BLD AUTO: 5.7 % — SIGNIFICANT CHANGE UP (ref 2–14)
NEUTROPHILS # BLD AUTO: 8.68 K/UL — HIGH (ref 1.8–7.4)
NEUTROPHILS NFR BLD AUTO: 78.7 % — HIGH (ref 43–77)
NITRITE UR-MCNC: NEGATIVE — SIGNIFICANT CHANGE UP
NRBC # BLD: 0 /100 WBCS — SIGNIFICANT CHANGE UP (ref 0–0)
PCO2 BLDV: 40 MMHG — SIGNIFICANT CHANGE UP (ref 39–42)
PCO2 BLDV: 45 MMHG — HIGH (ref 39–42)
PH BLDV: 7.36 — SIGNIFICANT CHANGE UP (ref 7.32–7.43)
PH BLDV: 7.39 — SIGNIFICANT CHANGE UP (ref 7.32–7.43)
PH UR: 5 — SIGNIFICANT CHANGE UP (ref 5–8)
PLATELET # BLD AUTO: 288 K/UL — SIGNIFICANT CHANGE UP (ref 150–400)
PO2 BLDV: 33 MMHG — SIGNIFICANT CHANGE UP (ref 25–45)
PO2 BLDV: 38 MMHG — SIGNIFICANT CHANGE UP (ref 25–45)
POTASSIUM BLDV-SCNC: 3.3 MMOL/L — LOW (ref 3.5–5.1)
POTASSIUM BLDV-SCNC: 3.5 MMOL/L — SIGNIFICANT CHANGE UP (ref 3.5–5.1)
POTASSIUM SERPL-MCNC: 3.7 MMOL/L — SIGNIFICANT CHANGE UP (ref 3.5–5.3)
POTASSIUM SERPL-SCNC: 3.7 MMOL/L — SIGNIFICANT CHANGE UP (ref 3.5–5.3)
PROT SERPL-MCNC: 9 G/DL — HIGH (ref 6–8.3)
PROT UR-MCNC: 30 MG/DL
RBC # BLD: 5.45 M/UL — HIGH (ref 3.8–5.2)
RBC # FLD: 14 % — SIGNIFICANT CHANGE UP (ref 10.3–14.5)
RBC CASTS # UR COMP ASSIST: 10 /HPF — HIGH (ref 0–4)
REVIEW: SIGNIFICANT CHANGE UP
SAO2 % BLDV: 53.4 % — LOW (ref 67–88)
SAO2 % BLDV: 64 % — LOW (ref 67–88)
SODIUM SERPL-SCNC: 139 MMOL/L — SIGNIFICANT CHANGE UP (ref 135–145)
SP GR SPEC: 1.02 — SIGNIFICANT CHANGE UP (ref 1–1.03)
SQUAMOUS # UR AUTO: 2 /HPF — SIGNIFICANT CHANGE UP (ref 0–5)
UROBILINOGEN FLD QL: 1 MG/DL — SIGNIFICANT CHANGE UP (ref 0.2–1)
WBC # BLD: 11.03 K/UL — HIGH (ref 3.8–10.5)
WBC # FLD AUTO: 11.03 K/UL — HIGH (ref 3.8–10.5)
WBC UR QL: 3 /HPF — SIGNIFICANT CHANGE UP (ref 0–5)

## 2024-03-01 PROCEDURE — 87086 URINE CULTURE/COLONY COUNT: CPT

## 2024-03-01 PROCEDURE — 81001 URINALYSIS AUTO W/SCOPE: CPT

## 2024-03-01 PROCEDURE — 80053 COMPREHEN METABOLIC PANEL: CPT

## 2024-03-01 PROCEDURE — 99285 EMERGENCY DEPT VISIT HI MDM: CPT

## 2024-03-01 PROCEDURE — 96374 THER/PROPH/DIAG INJ IV PUSH: CPT | Mod: XU

## 2024-03-01 PROCEDURE — 82803 BLOOD GASES ANY COMBINATION: CPT

## 2024-03-01 PROCEDURE — 99283 EMERGENCY DEPT VISIT LOW MDM: CPT

## 2024-03-01 PROCEDURE — 86304 IMMUNOASSAY TUMOR CA 125: CPT

## 2024-03-01 PROCEDURE — 86901 BLOOD TYPING SEROLOGIC RH(D): CPT

## 2024-03-01 PROCEDURE — 99284 EMERGENCY DEPT VISIT MOD MDM: CPT | Mod: 25

## 2024-03-01 PROCEDURE — 83690 ASSAY OF LIPASE: CPT

## 2024-03-01 PROCEDURE — 82435 ASSAY OF BLOOD CHLORIDE: CPT

## 2024-03-01 PROCEDURE — 86850 RBC ANTIBODY SCREEN: CPT

## 2024-03-01 PROCEDURE — 85014 HEMATOCRIT: CPT

## 2024-03-01 PROCEDURE — 82330 ASSAY OF CALCIUM: CPT

## 2024-03-01 PROCEDURE — 84132 ASSAY OF SERUM POTASSIUM: CPT

## 2024-03-01 PROCEDURE — 86301 IMMUNOASSAY TUMOR CA 19-9: CPT

## 2024-03-01 PROCEDURE — 85018 HEMOGLOBIN: CPT

## 2024-03-01 PROCEDURE — 74177 CT ABD & PELVIS W/CONTRAST: CPT | Mod: 26,MC

## 2024-03-01 PROCEDURE — 74177 CT ABD & PELVIS W/CONTRAST: CPT | Mod: MC

## 2024-03-01 PROCEDURE — 82378 CARCINOEMBRYONIC ANTIGEN: CPT

## 2024-03-01 PROCEDURE — 86900 BLOOD TYPING SEROLOGIC ABO: CPT

## 2024-03-01 PROCEDURE — 84295 ASSAY OF SERUM SODIUM: CPT

## 2024-03-01 PROCEDURE — 83605 ASSAY OF LACTIC ACID: CPT

## 2024-03-01 PROCEDURE — 82947 ASSAY GLUCOSE BLOOD QUANT: CPT

## 2024-03-01 PROCEDURE — 85025 COMPLETE CBC W/AUTO DIFF WBC: CPT

## 2024-03-01 RX ORDER — ACETAMINOPHEN 500 MG
1000 TABLET ORAL ONCE
Refills: 0 | Status: COMPLETED | OUTPATIENT
Start: 2024-03-01 | End: 2024-03-01

## 2024-03-01 RX ORDER — SODIUM CHLORIDE 9 MG/ML
1000 INJECTION INTRAMUSCULAR; INTRAVENOUS; SUBCUTANEOUS ONCE
Refills: 0 | Status: COMPLETED | OUTPATIENT
Start: 2024-03-01 | End: 2024-03-01

## 2024-03-01 RX ADMIN — SODIUM CHLORIDE 1000 MILLILITER(S): 9 INJECTION INTRAMUSCULAR; INTRAVENOUS; SUBCUTANEOUS at 11:30

## 2024-03-01 RX ADMIN — SODIUM CHLORIDE 1000 MILLILITER(S): 9 INJECTION INTRAMUSCULAR; INTRAVENOUS; SUBCUTANEOUS at 13:12

## 2024-03-01 RX ADMIN — Medication 400 MILLIGRAM(S): at 11:30

## 2024-03-01 NOTE — ED ADULT NURSE REASSESSMENT NOTE - NS ED NURSE REASSESS COMMENT FT1
Indwelling pradhan catheter placed as per MD Barrera order to r/o urinary retention. Sterility maintained, approximately 300cc of clear yellow urine drained. Pt tolerated well.
No

## 2024-03-01 NOTE — CONSULT NOTE ADULT - ASSESSMENT
74yo P2 presenting with RLQ pain x2d found to have large pelvic mass. Vitals stable. Labs unremarkable. Minimally tender to palpation on right side and patient states that Tylenol resolved her pain earlier today. Given patient's age and size of mass, need to stratify for malignant potential prior to any surgical intervention. No indication for emergent intervention.     - Motrin and Tylenol at home for pain   - Please send CEA, , and , patient does not need to wait for results   - Patient can follow up with gyn of her choice, some options are:     Dr. Jeffery Paige   [address] [place] [address] [phone]    Dr. Umm Knowles   [address] [facility] [address] [place] [id number]   [phone]    - no gyn contraindication to discharge     Marlen Parrish MD PGY4  d/w Dr. Vargas

## 2024-03-01 NOTE — ED CLERICAL - NS ED CLERK NOTE PRE-ARRIVAL INFORMATION; ADDITIONAL PRE-ARRIVAL INFORMATION
CC/Reason For referral: r/o appendicitis  Preferred Consultant(if applicable):  Who admits for you (if needed):  Do you have documents you would like to fax over?  Would you still like to speak to an ED attending? please call after patient was seen

## 2024-03-01 NOTE — ED PROVIDER NOTE - NSFOLLOWUPINSTRUCTIONS_ED_ALL_ED_FT
YOU WERE SEEN IN THE ED FOR: Abdominal pain   YOU WERE FOUND TO HAVE A PELVIC MASS.     PLEASE FOLLOW UP WITH GYN OUTPATIENT.   Dr. Jeffery Paige   [address] [place], [place] [id number] [phone]    Dr. Umm Knowles   [address] [address]   [phone]    FOR PAIN/FEVER, YOU MAY TAKE TYLENOL (acetaminophen) AND/OR IBUPROFEN (advil or motrin). FOLLOW THE INSTRUCTIONS ON THE LABEL/CONTAINER.    RETURN TO THE EMERGENCY DEPARTMENT IF YOU EXPERIENCE ANY NEW/CONCERNING/WORSENING SYMPTOMS SUCH AS BUT NOT LIMITED TO: SIGNIFICANT PAIN, INABILITY TO TOLERATE FOOD/DRINK, FEVER, WEIGHT LOSS, OR ANY OTHER CONCERNS.

## 2024-03-01 NOTE — ED PROVIDER NOTE - ATTENDING CONTRIBUTION TO CARE
I performed a history and physical exam of the patient and discussed their management with the resident and /or advanced care provider. I reviewed the resident and /or ACP's note and agree with the documented findings and plan of care. My medical decision making and observations are found above.  Lungs clear, abd soft but with rt lower pain

## 2024-03-01 NOTE — ED ADULT NURSE NOTE - NSFALLUNIVINTERV_ED_ALL_ED
Bed/Stretcher in lowest position, wheels locked, appropriate side rails in place/Call bell, personal items and telephone in reach/Instruct patient to call for assistance before getting out of bed/chair/stretcher/Non-slip footwear applied when patient is off stretcher/[place] to call system/Physically safe environment - no spills, clutter or unnecessary equipment/Purposeful proactive rounding/Room/bathroom lighting operational, light cord in reach

## 2024-03-01 NOTE — ED PROVIDER NOTE - PATIENT PORTAL LINK FT
You can access the FollowMyHealth Patient Portal offered by [facility] by registering at the following website: [email or web address]. By joining [facility]’s FollowMyHealth portal, you will also be able to view your health information using other applications (apps) compatible with our system.

## 2024-03-01 NOTE — ED PROVIDER NOTE - CLINICAL SUMMARY MEDICAL DECISION MAKING FREE TEXT BOX
73F with hx asthma, anxiety, hld, presents with 2 days of RLQ abdominal pain with anorexia. Evaluated by PMD today and sent to the ED for further evaluation and r/o appendicitis. Patient appears comfortable, mild RLQ tenderness on exam. Vitals WNL. Differential includes appendicitis, colitis, nephrolithiasis, uti. Plan for cbc, cmp, urinalysis, CT abd/pel, IVF and reassess. Declining pain medication at this time. 73F with hx asthma, anxiety, hld, presents with 2 days of RLQ abdominal pain with anorexia. Evaluated by PMD today and sent to the ED for further evaluation and r/o appendicitis. Patient appears comfortable, mild RLQ tenderness on exam. Vitals WNL. Differential includes appendicitis, colitis, nephrolithiasis, uti. Plan for cbc, cmp, urinalysis, CT abd/pel, IVF, Tylenol, and reassess. 73F with hx asthma, anxiety, hld, presents with 2 days of RLQ abdominal pain with anorexia. Evaluated by PMD today and sent to the ED for further evaluation and r/o appendicitis. Patient appears comfortable, mild RLQ tenderness on exam. Vitals WNL. Differential includes appendicitis, colitis, nephrolithiasis, uti. Plan for cbc, cmp, urinalysis, CT abd/pel, IVF, Tylenol, and reassess.  Carolyn: 73-year-old woman presents to the emergency department with 2 days of right lower quadrant pain and not wanting to eat so much.  Bowels are kind and normal.  No bleeding.  Patient saw PMD today who sent her to the emergency department for concerns of appendicitis.  Will get a CT to evaluate the lower abdominal tenderness and treat her symptoms. Lab studies ordered, independently reviewed and acted on as appropriate.

## 2024-03-01 NOTE — ED PROVIDER NOTE - PHYSICAL EXAMINATION
GEN: NAD, awake, eyes open spontaneously  EYES: normal conjunctiva, perrl  ENT: NCAT, MMM, Trachea midline  CHEST/LUNGS: Non-tachypneic, CTAB, bilateral breath sounds, no wheezing   CARDIAC: Non-tachycardic, normal perfusion  ABDOMEN: Soft, Mild RLQ and suprapubic tenderness, No rebound/guarding  MSK: No edema, no gross deformity of extremities  SKIN: No rashes, no petechiae, no vesicles  NEURO: CN grossly intact, normal coordination, no focal motor or sensory deficits  PSYCH: Alert, appropriate, cooperative, with capacity and insight
room air

## 2024-03-01 NOTE — CONSULT NOTE ADULT - ATTENDING COMMENTS
Patient discussed with resident. Presenting for evaluation of intermittent lower abdominal pain. Patient previously assessed in Urgent Care and was advised to be seen in ED by PMD. Patient reports pain is 9/10 at its worse but improved with Tylenol and currently 2/10 in severity.   Reports mild decrease in appetite but denies nausea/vomiting.   Past medical and surgical history reviewed. NKDA   Vitals reviewed   Physical exam reviewed   Labs and imaging reviewed   A/P: Right adnexal mass in postmenopausal patient   -agree with resident assessment and plan  -f/u tumor markers and f/u outpatient   -return precautions reviewed     QUINN Vargas

## 2024-03-01 NOTE — CONSULT NOTE ADULT - SUBJECTIVE AND OBJECTIVE BOX
TUNDE LIZARRAGA  73y  Female [id number] ***INCOMPLETE NOTE***    HPI:    72yo P2 presenting with RLQ pain x2d. Patient went to urgent care yesterday and was told she might have a GI virus. Her PCP recommended this AM that she go to the ED to rule out appendicitis. Patient states she has had mildly decreased appetite, no nausea or vomiting. Normal bowel and bladder function. No vaginal bleeding or discharge.     Name of GYN Physician:     POB:    Pgyn: Denies fibroids, cysts, endometriosis, STI's, Abnormal pap smears   PMHX:  PSHx:  Meds:  All:  Social History:  Denies smoking use, drug use, alcohol use.   +occasional social alcohol use    Vital Signs Last 24 Hrs  T(C): 37 (01 Mar 2024 13:10), Max: 37.5 (01 Mar 2024 12:17)  T(F): 98.6 (01 Mar 2024 13:10), Max: 99.5 (01 Mar 2024 12:17)  HR: 82 (01 Mar 2024 13:10) (80 - 87)  BP: 168/79 (01 Mar 2024 13:10) (158/86 - 184/91)  BP(mean): --  RR: 16 (01 Mar 2024 13:10) (16 - 18)  SpO2: 97% (01 Mar 2024 13:10) (97% - 100%)    Parameters below as of 01 Mar 2024 13:10  Patient On (Oxygen Delivery Method): room air        Physical Exam:   General: sitting comftorably in bed, NAD   HEENT: neck supple, full ROM  CV: RR S1S2 no m/r/g  Lungs: CTA b/l, good air flow b/l   Back: No CVA tenderness  Abd: Soft, non-tender, non-distended.  Bowel sounds present.    :  No bleeding on pad.  External labia wnl.  Bimanual exam with no cervical motion tenderness, uterus wnl, adnexa non palpable  and nontender bilaterally.  Cervix closed vs. Cervix dilated    cm   Speculum Exam: No active bleeding from os.  Physiologic discharge.    Ext: non-tender b/l, no edema     LABS:                              16.6   11.03 )-----------( 288      ( 01 Mar 2024 11:46 )             49.7     03    139  |  102  |  22  ----------------------------<  117<H>  3.7   |  22  |  0.79    Ca    11.1<H>      01 Mar 2024 11:46    TPro  9.0<H>  /  Alb  4.8  /  TBili  1.2  /  DBili  x   /  AST  32  /  ALT  22  /  AlkPhos  89  -    I&O's Detail      Urinalysis Basic - ( 01 Mar 2024 11:46 )    Color: Dark Yellow / Appearance: Cloudy / S.021 / pH: x  Gluc: 117 mg/dL / Ketone: 40 mg/dL  / Bili: Negative / Urobili: 1.0 mg/dL   Blood: x / Protein: 30 mg/dL / Nitrite: Negative   Leuk Esterase: Negative / RBC: 10 /HPF / WBC 3 /HPF   Sq Epi: x / Non Sq Epi: 2 /HPF / Bacteria: Negative /HPF        RADIOLOGY & ADDITIONAL STUDIES: TUNDE LIZARRAGA  73y  Female [id number]     HPI:    74yo P2 presenting with RLQ pain x2d. Patient went to urgent care yesterday and was told she might have a GI virus. Her PCP recommended this AM that she go to the ED to rule out appendicitis. Pain was present when she woke up on Wednesday morning and has been waxing and waning since. Maximum is a 9/10 but 2/10 at time of eval, improved with tylenol in the ED but she did not take anything at home. Patient states she has had mildly decreased appetite, no nausea or vomiting. Normal bowel and bladder function. No vaginal bleeding or discharge.     Name of GYN Physician: None    POB:  x2   Pgyn: Denies fibroids, cysts, endometriosis, STI's, Abnormal pap smears   PMHX: Asthma, HLD, Anxiety  PSHx: Spinal fusion  Meds: Sertraline, Asthma medications   All: NKDA   Social History:  Denies smoking use, drug use, alcohol use.      Vital Signs Last 24 Hrs  T(C): 37 (01 Mar 2024 13:10), Max: 37.5 (01 Mar 2024 12:17)  T(F): 98.6 (01 Mar 2024 13:10), Max: 99.5 (01 Mar 2024 12:17)  HR: 82 (01 Mar 2024 13:10) (80 - 87)  BP: 168/79 (01 Mar 2024 13:10) (158/86 - 184/91)  BP(mean): --  RR: 16 (01 Mar 2024 13:10) (16 - 18)  SpO2: 97% (01 Mar 2024 13:10) (97% - 100%)    Parameters below as of 01 Mar 2024 13:10  Patient On (Oxygen Delivery Method): room air        Physical Exam:   General: sitting comfortably in bed, NAD   CV: RRR  Lungs: nl work of breathing   Abd: Soft, minimally tender to deep palpation in RLQ, non-distended.     Ext: non-tender b/l, no edema     LABS:                              16.6   11.03 )-----------( 288      ( 01 Mar 2024 11:46 )             49.7     03-    139  |  102  |  22  ----------------------------<  117<H>  3.7   |  22  |  0.79    Ca    11.1<H>      01 Mar 2024 11:46    TPro  9.0<H>  /  Alb  4.8  /  TBili  1.2  /  DBili  x   /  AST  32  /  ALT  22  /  AlkPhos  89      I&O's Detail      Urinalysis Basic - ( 01 Mar 2024 11:46 )    Color: Dark Yellow / Appearance: Cloudy / S.021 / pH: x  Gluc: 117 mg/dL / Ketone: 40 mg/dL  / Bili: Negative / Urobili: 1.0 mg/dL   Blood: x / Protein: 30 mg/dL / Nitrite: Negative   Leuk Esterase: Negative / RBC: 10 /HPF / WBC 3 /HPF   Sq Epi: x / Non Sq Epi: 2 /HPF / Bacteria: Negative /HPF        RADIOLOGY & ADDITIONAL STUDIES:

## 2024-03-01 NOTE — ED PROVIDER NOTE - DIFFERENTIAL DIAGNOSIS
Differential Diagnosis Appendicitis, diverticulitis, colitis, intra-abdominal infection, gastroenteritis, abdominal pain, urinary tract infection.

## 2024-03-01 NOTE — ED ADULT NURSE NOTE - OBJECTIVE STATEMENT
Pt is a 73y with PMH of HLD complaining of abdominal pain. Pt reports onset of RLQ abdominal pain x 2 days, with nausea no vomiting. Denies fevers/chills. Last BM yesterday, solid. Denies changes in urination. Denies blood in urine or stool. Reported to PCP for abdominal pain, instructed to report to ED for further evaluation and appendicitis r/o. Upon assessment, A&Ox4, endorses RLQ abdominal tenderness 7/10, nausea. Temp afebrile. HTN 180s/90. Belongings at bedside.

## 2024-03-01 NOTE — ED PROVIDER NOTE - OBJECTIVE STATEMENT
73F with hx HLD, asthma, anxiety, presents to the ED complaining of right lower quadrant pain x 2 days. Nonradiating, intermittent in nature. Associated decreased appetite and PO intake. No vomiting, diarrhea, constipation, fever, chills, urinary or vaginal symptoms. Last BM yesterday. No prior abdominal surgeries. No excessive alcohol use. No hx kidney stones or recurrent UTIs.

## 2024-03-01 NOTE — ED PROVIDER NOTE - NS ED ROS FT
CONSTITUTIONAL: No fevers, no chills, no lightheadedness, no dizziness  EYES: no visual changes, no eye pain  EARS: no ear drainage, no ear pain, no change in hearing  NOSE: no nasal congestion  MOUTH/THROAT: no sore throat  CV: No chest pain, no palpitations  RESP: No SOB, no cough  GI: No n/v/d, + abd pain  : no dysuria, no hematuria, no flank pain, no vaginal discharge, no vaginal bleeding   MSK: no back pain, no extremity pain  SKIN: no rashes  NEURO: no headache, no focal weakness, no decreased sensation/parasthesias

## 2024-03-01 NOTE — ED PROVIDER NOTE - PROGRESS NOTE DETAILS
Diamond Barrera, DO (PGY-1): Lactate 2.5. CBC hemoconcentrated. Will give another liter of fluids and repeat lactate. Diamond Barrera,  (PGY-1): Large cystic structure evaluated on bedside ultrasound, possibly in urinary retention vs. cystic structure. Patient urinated while in ED. Pain free. Will place pradhan and reassess. Pending CT read. Diamond Barrera,  (PGY-1): CT revealed large right adnexal mass with swirling of the ovarian pedicle, concerning for ovarian torsion. 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 GYN consulted. Patient an daughter made aware. Diamond Barrera DO (PGY-1): Evaluated by GYN. Recommend adding on tumor markers. Stable for d/c from their standpoint. Patient's pain is controlled with Tylenol. GYN will provide referral for f/u and surgical planning. Patient informed and agreeable with plan.

## 2024-03-01 NOTE — ED ADULT NURSE NOTE - NSICDXPASTMEDICALHX_GEN_ALL_CORE_FT
PAST MEDICAL HISTORY:  Cervical disc displacement     Cervical radiculopathy     Cervical spinal stenosis     Eosinophilic asthma well-controlled on medication, no h/o hospitalizations or recent exacerbation    HLD (hyperlipidemia)     Panic attacks

## 2024-03-02 LAB
CANCER AG125 SERPL-ACNC: 31 U/ML — SIGNIFICANT CHANGE UP
CANCER AG19-9 SERPL-ACNC: 24 U/ML — SIGNIFICANT CHANGE UP
CEA SERPL-MCNC: 2.1 NG/ML — SIGNIFICANT CHANGE UP (ref 0–3.8)
CULTURE RESULTS: SIGNIFICANT CHANGE UP
SPECIMEN SOURCE: SIGNIFICANT CHANGE UP

## 2024-03-04 ENCOUNTER — APPOINTMENT (OUTPATIENT)
Dept: OBGYN | Facility: CLINIC | Age: 74
End: 2024-03-04
Payer: MEDICARE

## 2024-03-04 PROCEDURE — 99214 OFFICE O/P EST MOD 30 MIN: CPT

## 2024-03-04 NOTE — ED POST DISCHARGE NOTE - RESULT SUMMARY
Dr. Sims office calls requesting results be faxed to office, rep Tim WALSH able to assist - Liyah Rea PA-C

## 2024-03-08 ENCOUNTER — APPOINTMENT (OUTPATIENT)
Dept: GYNECOLOGIC ONCOLOGY | Facility: CLINIC | Age: 74
End: 2024-03-08
Payer: MEDICARE

## 2024-03-08 VITALS
HEART RATE: 73 BPM | HEIGHT: 65 IN | BODY MASS INDEX: 24.49 KG/M2 | SYSTOLIC BLOOD PRESSURE: 157 MMHG | WEIGHT: 147 LBS | DIASTOLIC BLOOD PRESSURE: 90 MMHG

## 2024-03-08 PROCEDURE — 99215 OFFICE O/P EST HI 40 MIN: CPT

## 2024-03-08 PROCEDURE — 99205 OFFICE O/P NEW HI 60 MIN: CPT

## 2024-03-08 NOTE — OB HISTORY
[Total Preg ___] : : [unfilled] [Vaginal ___] : [unfilled] vaginal delivery(s) [Menopause  Age ____] : menopause occurred at age [unfilled]

## 2024-03-08 NOTE — HISTORY OF PRESENT ILLNESS
[FreeTextEntry1] : Referred by Dr. Sims PCP Dr. Parker David  Ms. LIZARRAGA is a 73 year old, referred from Dr. Sims, for an ovarian cyst. She presented to the ED at [facility]  with 2 days of RLQ pain.  CT AP revealed a 14.4cm right adnexal cystic mass with concern for torsion.  Tumor markers (, CEA< and ) were obtained which were all normal.  3/1/2024- CT AP-     Liver/Gallbladder/Pancreas/Spleen/Adrenals/Kidneys/Bladder- Unremarkable   Right renal cyst and sub centimeter lesions to small to characterize    Thin septations right adnexal cystic mass with stained glass attenuation measuring 13.4 x 10 x 14.4 cm, swirling of the pedicle of the right ovary concerning for ovarian torsion   Uterus and left ovary are WNL   no adenopathy  3/1/2024- CEA-2.1, - 31, - 24  PMHx- asthma PSHx Family hx of cancer  Her pain has resolved since discharge.  She denies abdominal bloating/distention.  She denies a change in appetite, or a change in weight.  She is tolerating PO without nausea or vomiting.  She denies any change in bowel or bladder habits.  She denies any other associated signs or symptoms.  She is here to discuss further surgical management.  HM Pap 2021 fall Mammo 8/2023 Colonoscopy never

## 2024-03-08 NOTE — PHYSICAL EXAM
[Chaperone Present] : A chaperone was present in the examining room during all aspects of the physical examination [Normal] : Recto-Vaginal Exam: Normal [de-identified] : mobile mass at level of umbilicus

## 2024-03-08 NOTE — PHYSICAL EXAM
[Chaperone Present] : A chaperone was present in the examining room during all aspects of the physical examination [Normal] : Recto-Vaginal Exam: Normal [de-identified] : mobile mass at level of umbilicus

## 2024-03-08 NOTE — HISTORY OF PRESENT ILLNESS
[FreeTextEntry1] : Referred by Dr. Sims PCP Dr. Parker David  Ms. LIZARRAGA is a 73 year old, referred from Dr. Sims, for an ovarian cyst. She presented to the ED at [facility]  with 2 days of RLQ pain.  CT AP revealed a 14.4cm right adnexal cystic mass with concern for torsion.  Tumor markers (, CEA< and ) were obtained which were all normal.  3/1/2024- CT AP-     Liver/Gallbladder/Pancreas/Spleen/Adrenals/Kidneys/Bladder- Unremarkable   Right renal cyst and sub centimeter lesions to small to characterize    Thin septations right adnexal cystic mass with stained glass attenuation measuring 13.4 x 10 x 14.4 cm, swirling of the pedicle of the right ovary concerning for ovarian torsion   Uterus and left ovary are WNL   no adenopathy  3/1/2024- CEA-2.1, - 31, - 24  PMHx- asthma PSHx Family hx of cancer  Her pain has resolved since discharge.  She denies abdominal bloating/distention.  She denies a change in appetite, or a change in weight.  She is tolerating PO without nausea or vomiting.  She denies any change in bowel or bladder habits.  She denies any other associated signs or symptoms.  She is here to discuss further surgical management.  HM Pap 2021 fall Mammo 8/2023 Colonoscopy never

## 2024-03-08 NOTE — DISCUSSION/SUMMARY
[Reviewed Radiology Report(s)] : Radiology reports were reviewed. [Reviewed Radiology Film/Image(s)] : Images from radiology studies were reviewed and examined. [Pre Op] : The differential diagnosis was discussed in detail. The indications, risks, benefits and alternatives were discussed. [unfilled] expressed an understanding of the treatment rationale and her questions were answered to her apparent satisfaction. [FreeTextEntry1] : 73 year old with a large complex right adnexal mass  I discussed management options with Ms. LIZARRAGA and her daughter in detail.  We discussed that ovarian malignancy can only be diagnosed definitively after surgical excision.  The CA-125 is normal.  We discussed the limitations of CA-125 and possibility of false negatives. Due to size and probable ovarian torsion, I would recommend surgical excision.   Risks of surgery include bleeding/blood transfusion/infection/injury to bowel/bladder/ureter/blood vessels.  Risks of observation include disease progression.    I answered her questions to her apparent satisfaction.  She would like to proceed with surgery.  The plan will be for exlap BSO with possible hysterectomy and staging.  I discussed the rationale for open approach and importance of intact specimen extraction. Discussed possibility of  postoperative adjuvant chemotherapy.  Postoperative expectations and recovery were discussed in detail.  She will be referred for preoperative medical clearance.  Surgery will be scheduled in the near future.

## 2024-03-08 NOTE — PAST MEDICAL HISTORY
[Definite ___ (Date)] : the last menstrual period was [unfilled] [Living ___] : Living: [unfilled] [Total Preg ___] : G[unfilled]

## 2024-03-11 LAB — HPV HIGH+LOW RISK DNA PNL CVX: NOT DETECTED

## 2024-03-13 LAB — CYTOLOGY CVX/VAG DOC THIN PREP: ABNORMAL

## 2024-03-21 ENCOUNTER — OUTPATIENT (OUTPATIENT)
Dept: OUTPATIENT SERVICES | Facility: HOSPITAL | Age: 74
LOS: 1 days | End: 2024-03-21

## 2024-03-21 VITALS
SYSTOLIC BLOOD PRESSURE: 166 MMHG | WEIGHT: 134.92 LBS | TEMPERATURE: 98 F | HEIGHT: 63.25 IN | DIASTOLIC BLOOD PRESSURE: 86 MMHG | OXYGEN SATURATION: 98 % | HEART RATE: 71 BPM | RESPIRATION RATE: 18 BRPM

## 2024-03-21 DIAGNOSIS — R19.00 INTRA-ABDOMINAL AND PELVIC SWELLING, MASS AND LUMP, UNSPECIFIED SITE: ICD-10-CM

## 2024-03-21 DIAGNOSIS — Z98.890 OTHER SPECIFIED POSTPROCEDURAL STATES: Chronic | ICD-10-CM

## 2024-03-21 DIAGNOSIS — Z98.1 ARTHRODESIS STATUS: Chronic | ICD-10-CM

## 2024-03-21 LAB
A1C WITH ESTIMATED AVERAGE GLUCOSE RESULT: 5.5 % — SIGNIFICANT CHANGE UP (ref 4–5.6)
ANION GAP SERPL CALC-SCNC: 11 MMOL/L — SIGNIFICANT CHANGE UP (ref 7–14)
BLD GP AB SCN SERPL QL: NEGATIVE — SIGNIFICANT CHANGE UP
BUN SERPL-MCNC: 20 MG/DL — SIGNIFICANT CHANGE UP (ref 7–23)
CALCIUM SERPL-MCNC: 10.2 MG/DL — SIGNIFICANT CHANGE UP (ref 8.4–10.5)
CHLORIDE SERPL-SCNC: 106 MMOL/L — SIGNIFICANT CHANGE UP (ref 98–107)
CO2 SERPL-SCNC: 24 MMOL/L — SIGNIFICANT CHANGE UP (ref 22–31)
CREAT SERPL-MCNC: 0.72 MG/DL — SIGNIFICANT CHANGE UP (ref 0.5–1.3)
EGFR: 87 ML/MIN/1.73M2 — SIGNIFICANT CHANGE UP
ESTIMATED AVERAGE GLUCOSE: 111 — SIGNIFICANT CHANGE UP
GLUCOSE SERPL-MCNC: 84 MG/DL — SIGNIFICANT CHANGE UP (ref 70–99)
HCT VFR BLD CALC: 44.2 % — SIGNIFICANT CHANGE UP (ref 34.5–45)
HGB BLD-MCNC: 14.5 G/DL — SIGNIFICANT CHANGE UP (ref 11.5–15.5)
MCHC RBC-ENTMCNC: 30.1 PG — SIGNIFICANT CHANGE UP (ref 27–34)
MCHC RBC-ENTMCNC: 32.8 GM/DL — SIGNIFICANT CHANGE UP (ref 32–36)
MCV RBC AUTO: 91.9 FL — SIGNIFICANT CHANGE UP (ref 80–100)
NRBC # BLD: 0 /100 WBCS — SIGNIFICANT CHANGE UP (ref 0–0)
NRBC # FLD: 0 K/UL — SIGNIFICANT CHANGE UP (ref 0–0)
PLATELET # BLD AUTO: 286 K/UL — SIGNIFICANT CHANGE UP (ref 150–400)
POTASSIUM SERPL-MCNC: 3.8 MMOL/L — SIGNIFICANT CHANGE UP (ref 3.5–5.3)
POTASSIUM SERPL-SCNC: 3.8 MMOL/L — SIGNIFICANT CHANGE UP (ref 3.5–5.3)
RBC # BLD: 4.81 M/UL — SIGNIFICANT CHANGE UP (ref 3.8–5.2)
RBC # FLD: 14.1 % — SIGNIFICANT CHANGE UP (ref 10.3–14.5)
RH IG SCN BLD-IMP: POSITIVE — SIGNIFICANT CHANGE UP
RH IG SCN BLD-IMP: POSITIVE — SIGNIFICANT CHANGE UP
SODIUM SERPL-SCNC: 141 MMOL/L — SIGNIFICANT CHANGE UP (ref 135–145)
WBC # BLD: 7.06 K/UL — SIGNIFICANT CHANGE UP (ref 3.8–10.5)
WBC # FLD AUTO: 7.06 K/UL — SIGNIFICANT CHANGE UP (ref 3.8–10.5)

## 2024-03-21 RX ORDER — MONTELUKAST 4 MG/1
1 TABLET, CHEWABLE ORAL
Qty: 0 | Refills: 0 | DISCHARGE

## 2024-03-21 RX ORDER — SERTRALINE 25 MG/1
75 TABLET, FILM COATED ORAL
Qty: 0 | Refills: 0 | DISCHARGE

## 2024-03-21 RX ORDER — DESLORATADINE 5 MG/1
1 TABLET, FILM COATED ORAL
Qty: 0 | Refills: 0 | DISCHARGE

## 2024-03-21 RX ORDER — SODIUM CHLORIDE 9 MG/ML
1000 INJECTION, SOLUTION INTRAVENOUS
Refills: 0 | Status: DISCONTINUED | OUTPATIENT
Start: 2024-03-26 | End: 2024-03-26

## 2024-03-21 RX ORDER — FLUTICASONE PROPIONATE AND SALMETEROL 50; 250 UG/1; UG/1
1 POWDER ORAL; RESPIRATORY (INHALATION)
Qty: 0 | Refills: 0 | DISCHARGE

## 2024-03-21 RX ORDER — ROSUVASTATIN CALCIUM 5 MG/1
1 TABLET ORAL
Qty: 0 | Refills: 0 | DISCHARGE

## 2024-03-21 RX ORDER — TIOTROPIUM BROMIDE 18 UG/1
2 CAPSULE ORAL; RESPIRATORY (INHALATION)
Qty: 0 | Refills: 0 | DISCHARGE

## 2024-03-21 RX ORDER — CHLORHEXIDINE GLUCONATE 213 G/1000ML
1 SOLUTION TOPICAL DAILY
Refills: 0 | Status: DISCONTINUED | OUTPATIENT
Start: 2024-03-26 | End: 2024-03-26

## 2024-03-21 NOTE — H&P PST ADULT - ASSESSMENT
74 yo female with h/o HLD, eosinophilic asthma (controlled on medications), anxiety presents to PST scheduled for exploratory laparotomy, bilateral salpingo oophorectomy, possible hysterectomy, staging with Dr. Cedillo

## 2024-03-21 NOTE — H&P PST ADULT - PROBLEM SELECTOR PLAN 1
-Scheduled for exploratory laparotomy, bilateral salpingo oophorectomy, possible hysterectomy, staging with Dr. Cedillo on 03/26/2024.   -Verbal and written pre-op instructions provided to patient. Patient verbalized understanding and will call surgeons office for revised instructions if surgery is rescheduled.   -Pepcid for GI prophylaxis provided.   -Patient given verbal and written instruction with teach back on chlorhexidine shampoo, and the patient verbalized understanding with return demonstration.

## 2024-03-21 NOTE — H&P PST ADULT - NS MD HP INPLANTS MED DEV
You can access the FollowMyHealth Patient Portal offered by [facility] by registering at the following website: [email or web address]. By joining [employer]’s FollowMyHealth portal, you will also be able to view your health information using other applications (apps) compatible with our system. cervical hardware

## 2024-03-21 NOTE — H&P PST ADULT - GASTROINTESTINAL COMMENTS
RLQ abdominal pain on March 4th with incidental finding of ovarian mass and torsion on CT scan done at [facility] ED.

## 2024-03-21 NOTE — H&P PST ADULT - HISTORY OF PRESENT ILLNESS
74 yo female with h/o HLD, eosinophilic asthma (controlled on medications), anxiety  72 yo female with h/o HLD, eosinophilic asthma (controlled on medications), anxiety presents to PST with c/o of RLQ abdominal pain that occurred in March s/p evaluation in ED with CT scan demonstrating right  ovarian cyst and torsion now scheduled for exploratory laparotomy, bilateral salpingo oophorectomy, possible hysterectomy, staging with Dr. Cedillo.

## 2024-03-21 NOTE — H&P PST ADULT - LYMPHATIC
anterior cervical L/anterior cervical R anterior cervical L/anterior cervical R/No lymphadedenopathy

## 2024-03-25 ENCOUNTER — TRANSCRIPTION ENCOUNTER (OUTPATIENT)
Age: 74
End: 2024-03-25

## 2024-03-25 NOTE — ASU PATIENT PROFILE, ADULT - VISION (WITH CORRECTIVE LENSES IF THE PATIENT USUALLY WEARS THEM):
None
Partially impaired: cannot see medication labels or newsprint, but can see obstacles in path, and the surrounding layout; can count fingers at arm's length

## 2024-03-25 NOTE — ASU PATIENT PROFILE, ADULT - PROVIDER NOTIFICATION
Please have patient get renal labs checked as ordered. I need to put him on an antibiotic for a positive urine culture and the antibiotics I can use are metabolized through the kidney. Once I have the kidney lab results, I will placed him on an antibiotic. Declines

## 2024-03-26 ENCOUNTER — NON-APPOINTMENT (OUTPATIENT)
Age: 74
End: 2024-03-26

## 2024-03-26 ENCOUNTER — APPOINTMENT (OUTPATIENT)
Dept: GYNECOLOGIC ONCOLOGY | Facility: HOSPITAL | Age: 74
End: 2024-03-26

## 2024-03-26 ENCOUNTER — RESULT REVIEW (OUTPATIENT)
Age: 74
End: 2024-03-26

## 2024-03-26 ENCOUNTER — INPATIENT (INPATIENT)
Facility: HOSPITAL | Age: 74
LOS: 1 days | Discharge: HOME | End: 2024-03-28
Attending: OBSTETRICS & GYNECOLOGY | Admitting: OBSTETRICS & GYNECOLOGY
Payer: MEDICARE

## 2024-03-26 VITALS
HEIGHT: 63.25 IN | TEMPERATURE: 98 F | WEIGHT: 134.92 LBS | OXYGEN SATURATION: 99 % | RESPIRATION RATE: 17 BRPM | DIASTOLIC BLOOD PRESSURE: 86 MMHG | SYSTOLIC BLOOD PRESSURE: 157 MMHG | HEART RATE: 80 BPM

## 2024-03-26 DIAGNOSIS — Z87.09 PERSONAL HISTORY OF OTHER DISEASES OF THE RESPIRATORY SYSTEM: ICD-10-CM

## 2024-03-26 DIAGNOSIS — F41.9 ANXIETY DISORDER, UNSPECIFIED: ICD-10-CM

## 2024-03-26 DIAGNOSIS — R03.0 ELEVATED BLOOD-PRESSURE READING, WITHOUT DIAGNOSIS OF HYPERTENSION: ICD-10-CM

## 2024-03-26 DIAGNOSIS — Z98.1 ARTHRODESIS STATUS: Chronic | ICD-10-CM

## 2024-03-26 DIAGNOSIS — R19.00 INTRA-ABDOMINAL AND PELVIC SWELLING, MASS AND LUMP, UNSPECIFIED SITE: ICD-10-CM

## 2024-03-26 DIAGNOSIS — N83.209 UNSPECIFIED OVARIAN CYST, UNSPECIFIED SIDE: ICD-10-CM

## 2024-03-26 DIAGNOSIS — Z86.79 PERSONAL HISTORY OF OTHER DISEASES OF THE CIRCULATORY SYSTEM: ICD-10-CM

## 2024-03-26 LAB — GLUCOSE BLDC GLUCOMTR-MCNC: 90 MG/DL — SIGNIFICANT CHANGE UP (ref 70–99)

## 2024-03-26 PROCEDURE — 88342 IMHCHEM/IMCYTCHM 1ST ANTB: CPT | Mod: 26

## 2024-03-26 PROCEDURE — 88305 TISSUE EXAM BY PATHOLOGIST: CPT | Mod: 26

## 2024-03-26 PROCEDURE — 88305 TISSUE EXAM BY PATHOLOGIST: CPT | Mod: 26,XP

## 2024-03-26 PROCEDURE — 88112 CYTOPATH CELL ENHANCE TECH: CPT | Mod: 26

## 2024-03-26 PROCEDURE — 49205: CPT

## 2024-03-26 DEVICE — SEPRAFILM 5 X 6": Type: IMPLANTABLE DEVICE | Status: FUNCTIONAL

## 2024-03-26 RX ORDER — SENNA PLUS 8.6 MG/1
2 TABLET ORAL AT BEDTIME
Refills: 0 | Status: DISCONTINUED | OUTPATIENT
Start: 2024-03-26 | End: 2024-03-28

## 2024-03-26 RX ORDER — LORATADINE 10 MG/1
10 TABLET ORAL DAILY
Refills: 0 | Status: DISCONTINUED | OUTPATIENT
Start: 2024-03-26 | End: 2024-03-28

## 2024-03-26 RX ORDER — OXYCODONE HYDROCHLORIDE 5 MG/1
10 TABLET ORAL EVERY 6 HOURS
Refills: 0 | Status: DISCONTINUED | OUTPATIENT
Start: 2024-03-26 | End: 2024-03-28

## 2024-03-26 RX ORDER — FLUTICASONE PROPIONATE AND SALMETEROL 50; 250 UG/1; UG/1
1 POWDER ORAL; RESPIRATORY (INHALATION)
Refills: 0 | DISCHARGE

## 2024-03-26 RX ORDER — HYDROMORPHONE HYDROCHLORIDE 2 MG/ML
0.5 INJECTION INTRAMUSCULAR; INTRAVENOUS; SUBCUTANEOUS
Refills: 0 | Status: DISCONTINUED | OUTPATIENT
Start: 2024-03-26 | End: 2024-03-26

## 2024-03-26 RX ORDER — SODIUM CHLORIDE 9 MG/ML
1000 INJECTION, SOLUTION INTRAVENOUS
Refills: 0 | Status: DISCONTINUED | OUTPATIENT
Start: 2024-03-26 | End: 2024-03-27

## 2024-03-26 RX ORDER — SIMETHICONE 80 MG/1
80 TABLET, CHEWABLE ORAL DAILY
Refills: 0 | Status: DISCONTINUED | OUTPATIENT
Start: 2024-03-26 | End: 2024-03-28

## 2024-03-26 RX ORDER — ACETAMINOPHEN 500 MG
3 TABLET ORAL
Qty: 0 | Refills: 0 | DISCHARGE

## 2024-03-26 RX ORDER — MONTELUKAST 4 MG/1
10 TABLET, CHEWABLE ORAL AT BEDTIME
Refills: 0 | Status: DISCONTINUED | OUTPATIENT
Start: 2024-03-26 | End: 2024-03-28

## 2024-03-26 RX ORDER — HEPARIN SODIUM 5000 [USP'U]/ML
5000 INJECTION INTRAVENOUS; SUBCUTANEOUS EVERY 8 HOURS
Refills: 0 | Status: DISCONTINUED | OUTPATIENT
Start: 2024-03-26 | End: 2024-03-28

## 2024-03-26 RX ORDER — SERTRALINE 25 MG/1
150 TABLET, FILM COATED ORAL DAILY
Refills: 0 | Status: DISCONTINUED | OUTPATIENT
Start: 2024-03-26 | End: 2024-03-27

## 2024-03-26 RX ORDER — OXYCODONE HYDROCHLORIDE 5 MG/1
5 TABLET ORAL EVERY 6 HOURS
Refills: 0 | Status: DISCONTINUED | OUTPATIENT
Start: 2024-03-26 | End: 2024-03-28

## 2024-03-26 RX ORDER — ROSUVASTATIN CALCIUM 5 MG/1
1 TABLET ORAL
Refills: 0 | DISCHARGE

## 2024-03-26 RX ORDER — ONDANSETRON 8 MG/1
4 TABLET, FILM COATED ORAL EVERY 6 HOURS
Refills: 0 | Status: DISCONTINUED | OUTPATIENT
Start: 2024-03-26 | End: 2024-03-28

## 2024-03-26 RX ORDER — MONTELUKAST 4 MG/1
1 TABLET, CHEWABLE ORAL
Refills: 0 | DISCHARGE

## 2024-03-26 RX ORDER — DESLORATADINE 5 MG/1
1 TABLET, FILM COATED ORAL
Refills: 0 | DISCHARGE

## 2024-03-26 RX ORDER — KETOROLAC TROMETHAMINE 30 MG/ML
15 SYRINGE (ML) INJECTION EVERY 6 HOURS
Refills: 0 | Status: DISCONTINUED | OUTPATIENT
Start: 2024-03-26 | End: 2024-03-27

## 2024-03-26 RX ORDER — ONDANSETRON 8 MG/1
4 TABLET, FILM COATED ORAL ONCE
Refills: 0 | Status: DISCONTINUED | OUTPATIENT
Start: 2024-03-26 | End: 2024-03-26

## 2024-03-26 RX ORDER — SERTRALINE 25 MG/1
1.5 TABLET, FILM COATED ORAL
Refills: 0 | DISCHARGE

## 2024-03-26 RX ORDER — ACETAMINOPHEN 500 MG
1000 TABLET ORAL EVERY 6 HOURS
Refills: 0 | Status: COMPLETED | OUTPATIENT
Start: 2024-03-26 | End: 2024-03-27

## 2024-03-26 RX ORDER — TIOTROPIUM BROMIDE 18 UG/1
2 CAPSULE ORAL; RESPIRATORY (INHALATION) DAILY
Refills: 0 | Status: DISCONTINUED | OUTPATIENT
Start: 2024-03-26 | End: 2024-03-28

## 2024-03-26 RX ADMIN — MONTELUKAST 10 MILLIGRAM(S): 4 TABLET, CHEWABLE ORAL at 21:14

## 2024-03-26 RX ADMIN — Medication 400 MILLIGRAM(S): at 18:02

## 2024-03-26 RX ADMIN — HYDROMORPHONE HYDROCHLORIDE 0.5 MILLIGRAM(S): 2 INJECTION INTRAMUSCULAR; INTRAVENOUS; SUBCUTANEOUS at 14:00

## 2024-03-26 RX ADMIN — HYDROMORPHONE HYDROCHLORIDE 0.5 MILLIGRAM(S): 2 INJECTION INTRAMUSCULAR; INTRAVENOUS; SUBCUTANEOUS at 14:15

## 2024-03-26 RX ADMIN — OXYCODONE HYDROCHLORIDE 10 MILLIGRAM(S): 5 TABLET ORAL at 02:30

## 2024-03-26 RX ADMIN — ONDANSETRON 4 MILLIGRAM(S): 8 TABLET, FILM COATED ORAL at 19:01

## 2024-03-26 RX ADMIN — HYDROMORPHONE HYDROCHLORIDE 0.5 MILLIGRAM(S): 2 INJECTION INTRAMUSCULAR; INTRAVENOUS; SUBCUTANEOUS at 14:03

## 2024-03-26 RX ADMIN — Medication 1 MILLIGRAM(S): at 14:13

## 2024-03-26 RX ADMIN — CHLORHEXIDINE GLUCONATE 1 APPLICATION(S): 213 SOLUTION TOPICAL at 10:30

## 2024-03-26 RX ADMIN — SODIUM CHLORIDE 30 MILLILITER(S): 9 INJECTION, SOLUTION INTRAVENOUS at 10:30

## 2024-03-26 RX ADMIN — HYDROMORPHONE HYDROCHLORIDE 0.5 MILLIGRAM(S): 2 INJECTION INTRAMUSCULAR; INTRAVENOUS; SUBCUTANEOUS at 13:44

## 2024-03-26 RX ADMIN — HEPARIN SODIUM 5000 UNIT(S): 5000 INJECTION INTRAVENOUS; SUBCUTANEOUS at 20:44

## 2024-03-26 RX ADMIN — SODIUM CHLORIDE 125 MILLILITER(S): 9 INJECTION, SOLUTION INTRAVENOUS at 13:45

## 2024-03-26 RX ADMIN — Medication 1000 MILLIGRAM(S): at 18:12

## 2024-03-26 RX ADMIN — OXYCODONE HYDROCHLORIDE 10 MILLIGRAM(S): 5 TABLET ORAL at 20:43

## 2024-03-26 NOTE — DISCHARGE NOTE PROVIDER - NSDCFUADDINST_GEN_ALL_CORE_FT
Postoperative Instructions    For pain control, take the followin. Ibuprofen 600mg every 6 hours, take with food  2. Add 975mg every 6 hours, alternated with ibuprofen  Tylenol and ibuprofen may be obtained over the counter.  3. Oxycodone 5mg every 6 hours as needed for severe pain. A prescription has been sent to your pharmacy.    Return to your regular way of eating.     Resume normal activity as tolerated, but no heavy lifting or strenuous activity for 6 weeks. Complete vaginal rest, no tampons, no douching, no tub bathing, no sexual activities for 6 weeks unless otherwise instructed by your doctor.      No driving while on narcotic pain medication.      Call your doctor with any signs and symptoms of infection such as fever (>100.4 F), chills, nausea or vomiting.  Call your doctor if you're unable to tolerate food or have difficulty urinating.  Call your doctor if you have pain that is not relieved by your prescribed medications. Call your doctor with redness or swelling at the incision site, fluid leakage or wound separation.    Notify your doctor with any other concerns. Follow up with your doctor in 2 weeks for a post-operative appointment. Postoperative Instructions    For pain control, take the followin. Naproxen 500mg BID, take with food  2. Add 975mg every 6 hours, alternated with ibuprofen  Tylenol and ibuprofen may be obtained over the counter.  3. Oxycodone 5mg every 6 hours as needed for severe pain. A prescription has been sent to your pharmacy.    Return to your regular way of eating.     Resume normal activity as tolerated, but no heavy lifting or strenuous activity for 6 weeks. Complete vaginal rest, no tampons, no douching, no tub bathing, no sexual activities for 6 weeks unless otherwise instructed by your doctor.      No driving while on narcotic pain medication.      Call your doctor with any signs and symptoms of infection such as fever (>100.4 F), chills, nausea or vomiting.  Call your doctor if you're unable to tolerate food or have difficulty urinating.  Call your doctor if you have pain that is not relieved by your prescribed medications. Call your doctor with redness or swelling at the incision site, fluid leakage or wound separation.    Notify your doctor with any other concerns. Follow up with your doctor as scheduled on  to discuss the results of your surgery.

## 2024-03-26 NOTE — DISCHARGE NOTE PROVIDER - NSDCCPTREATMENT_GEN_ALL_CORE_FT
PRINCIPAL PROCEDURE  Procedure: Bilateral salpingoophorectomy  Findings and Treatment:       SECONDARY PROCEDURE  Procedure: Exploratory laparotomy  Findings and Treatment:

## 2024-03-26 NOTE — BRIEF OPERATIVE NOTE - OPERATION/FINDINGS
EUA with mobile abdominal mass  Laparotomy demonstrated grossly normal bowel and omentum and appendix. Large ovarian cyst 14cm torsed x2 arising from R ovary. Uterus and L adnexa grossly normal  Frozen intra-op pathology benign  Oozing on posterior uterus ligated with 0 Vicryl figure of 8's  Good hemostasis

## 2024-03-26 NOTE — BRIEF OPERATIVE NOTE - NSICDXBRIEFPROCEDURE_GEN_ALL_CORE_FT
PROCEDURES:  Exploratory laparotomy 26-Mar-2024 13:21:00  Melanie Rodriguez  Bilateral salpingoophorectomy 26-Mar-2024 13:20:50  Melanie Rodriguez

## 2024-03-26 NOTE — DISCHARGE NOTE PROVIDER - NSDCMRMEDTOKEN_GEN_ALL_CORE_FT
desloratadine 5 mg oral tablet: 1 tab(s) orally once a day  LORazepam 0.5 mg oral tablet: 1 tab(s) orally once a day as needed  montelukast 10 mg oral tablet: 1 tab(s) orally once a day (at bedtime)  Motrin 600 mg oral tablet: 1 tab(s) orally every 6 hours as needed for  moderate pain  rosuvastatin 5 mg oral tablet: 1 tab(s) orally once a day  sertraline 50 mg oral tablet: 1.5 tab(s) orally once a day (in the morning)  Spiriva Respimat 28 ACT 2.5 mcg/inh inhalation aerosol: 2 puff(s) inhaled once a day  Tylenol 325 mg oral tablet: 3 tab(s) orally every 6 hours as needed for  moderate pain  Wixela Inhub 250 mcg-50 mcg inhalation powder: 1 puff(s) inhaled 2 times a day   desloratadine 5 mg oral tablet: 1 tab(s) orally once a day  LORazepam 0.5 mg oral tablet: 1 tab(s) orally once a day as needed  montelukast 10 mg oral tablet: 1 tab(s) orally once a day (at bedtime)  naproxen 500 mg oral delayed release tablet: 1 tab(s) orally 2 times a day  oxyCODONE 5 mg oral tablet: 1 tab(s) orally every 6 hours as needed for  severe pain MDD: 4 tab/day  rosuvastatin 5 mg oral tablet: 1 tab(s) orally once a day  sertraline 50 mg oral tablet: 1.5 tab(s) orally once a day (in the morning)  Tylenol 325 mg oral tablet: 3 tab(s) orally every 6 hours as needed for  moderate pain  Wixela Inhub 250 mcg-50 mcg inhalation powder: 1 puff(s) inhaled 2 times a day   desloratadine 5 mg oral tablet: 1 tab(s) orally once a day  LORazepam 0.5 mg oral tablet: 1 tab(s) orally once a day as needed  montelukast 10 mg oral tablet: 1 tab(s) orally once a day (at bedtime)  naproxen 500 mg oral delayed release tablet: 1 tab(s) orally 2 times a day  Outpatient Physical Therapy: 2-3x/week x 1 month  oxyCODONE 5 mg oral tablet: 1 tab(s) orally every 6 hours as needed for  severe pain MDD: 4 tab/day  rosuvastatin 5 mg oral tablet: 1 tab(s) orally once a day  sertraline 50 mg oral tablet: 1.5 tab(s) orally once a day (in the morning)  Tylenol 325 mg oral tablet: 3 tab(s) orally every 6 hours as needed for  moderate pain  Wixela Inhub 250 mcg-50 mcg inhalation powder: 1 puff(s) inhaled 2 times a day

## 2024-03-26 NOTE — DISCHARGE NOTE PROVIDER - HOSPITAL COURSE
Patient underwent a for exploratory laparotomy, bilateral salpingo-oophorectomy and bilateral TAP blocks. Frozen= Benign EBL:30. Hct:   44.2->***.      POD#0 Pain was well controlled with bilateral TAP blocks, IV Tylenol and IV Toradol.     *****POD#1 No acute events overnight. Patient was advanced to a regular diet. Devi was discontinued and patient voided spontaneously. Patient was transitioned to oral analgesics and pain was well controlled.     Upon discharge on POD# , the patient is ambulating, voiding spontaneously, tolerating oral intake, pain was well controlled with oral medication, and vital signs were stable. Patient to have close follow up with [facility]. Patient underwent a for exploratory laparotomy, bilateral salpingo-oophorectomy and bilateral TAP blocks. Frozen= Benign EBL:30. Hct:   44.2->38.3.      POD#0 Pain was well controlled with bilateral TAP blocks, IV Tylenol and IV Toradol.     POD#1 No acute events overnight. Patient was advanced to a regular diet. Devi was discontinued and patient voided spontaneously. Patient was transitioned to oral analgesics and pain was well controlled.     POD#2 Patient progressing well postoperatively. Passing flatus.    Upon discharge on POD# XXX, the patient is ambulating, voiding spontaneously, tolerating oral intake, pain was well controlled with oral medication, and vital signs were stable. Patient to have close follow up with [facility]. Patient underwent a for exploratory laparotomy, bilateral salpingo-oophorectomy and bilateral TAP blocks. Frozen= Benign EBL:30.    POD#0 Pain was well controlled with bilateral TAP blocks, IV Tylenol and IV Toradol.     POD#1 No acute events overnight. Patient was advanced to a regular diet. Devi was discontinued and patient voided spontaneously. Patient was transitioned to oral analgesics and pain was well controlled. Patient was seen by PT; recs: outpatient PT w/ rolling walker.     POD#2 Patient progressing well postoperatively. Passing flatus.    Upon discharge on POD# 2, the patient is ambulating, voiding spontaneously, tolerating oral intake, pain was well controlled with oral medication, and vital signs were stable. Patient to have close follow up with [facility].                12.9   10.53 )-----------( 235      ( 03-27 @ 05:35 )             38.3

## 2024-03-26 NOTE — DISCHARGE NOTE PROVIDER - CARE PROVIDER_API CALL
Bernadette Cedillo  Gynecologic Oncology  [address] [place] [id number]  Phone: [phone]  Fax: [phone]  Established Patient  Follow Up Time: 2 weeks

## 2024-03-26 NOTE — BRIEF OPERATIVE NOTE - ESTIMATED BLOOD LOSS
----- Message from Yuan Arreguin PA-C sent at 4/8/2021  3:43 PM CDT -----  Please let the patient know that her vitamin-D level is low.  We would like to start 26377 units of vitamin-D weekly for 8 weeks, then switched to 800 units over-the-counter thereafter.  We should have her follow-up in 8 weeks time for re-evaluation.  Thank you.  
Patient notified of results below. Patient verbalizes understanding and has no further questions.   Repeat vit d ordered- rx for vit d 24076 units also sent     
30

## 2024-03-26 NOTE — PATIENT PROFILE ADULT - FALL HARM RISK - HARM RISK INTERVENTIONS

## 2024-03-26 NOTE — PATIENT PROFILE ADULT - FUNCTIONAL ASSESSMENT - BASIC MOBILITY 6.
2-calculated by average/Not able to assess (calculate score using [facility] averaging method)

## 2024-03-27 LAB
ANION GAP SERPL CALC-SCNC: 11 MMOL/L — SIGNIFICANT CHANGE UP (ref 7–14)
BASOPHILS # BLD AUTO: 0.01 K/UL — SIGNIFICANT CHANGE UP (ref 0–0.2)
BASOPHILS NFR BLD AUTO: 0.1 % — SIGNIFICANT CHANGE UP (ref 0–2)
BUN SERPL-MCNC: 14 MG/DL — SIGNIFICANT CHANGE UP (ref 7–23)
CALCIUM SERPL-MCNC: 9.4 MG/DL — SIGNIFICANT CHANGE UP (ref 8.4–10.5)
CHLORIDE SERPL-SCNC: 104 MMOL/L — SIGNIFICANT CHANGE UP (ref 98–107)
CO2 SERPL-SCNC: 21 MMOL/L — LOW (ref 22–31)
CREAT SERPL-MCNC: 0.66 MG/DL — SIGNIFICANT CHANGE UP (ref 0.5–1.3)
EGFR: 91 ML/MIN/1.73M2 — SIGNIFICANT CHANGE UP
EOSINOPHIL # BLD AUTO: 0 K/UL — SIGNIFICANT CHANGE UP (ref 0–0.5)
EOSINOPHIL NFR BLD AUTO: 0 % — SIGNIFICANT CHANGE UP (ref 0–6)
GLUCOSE SERPL-MCNC: 108 MG/DL — HIGH (ref 70–99)
HCT VFR BLD CALC: 38.3 % — SIGNIFICANT CHANGE UP (ref 34.5–45)
HGB BLD-MCNC: 12.9 G/DL — SIGNIFICANT CHANGE UP (ref 11.5–15.5)
IANC: 8.37 K/UL — HIGH (ref 1.8–7.4)
IMM GRANULOCYTES NFR BLD AUTO: 0.3 % — SIGNIFICANT CHANGE UP (ref 0–0.9)
LYMPHOCYTES # BLD AUTO: 1.41 K/UL — SIGNIFICANT CHANGE UP (ref 1–3.3)
LYMPHOCYTES # BLD AUTO: 13.4 % — SIGNIFICANT CHANGE UP (ref 13–44)
MAGNESIUM SERPL-MCNC: 1.7 MG/DL — SIGNIFICANT CHANGE UP (ref 1.6–2.6)
MCHC RBC-ENTMCNC: 30.1 PG — SIGNIFICANT CHANGE UP (ref 27–34)
MCHC RBC-ENTMCNC: 33.7 GM/DL — SIGNIFICANT CHANGE UP (ref 32–36)
MCV RBC AUTO: 89.5 FL — SIGNIFICANT CHANGE UP (ref 80–100)
MONOCYTES # BLD AUTO: 0.71 K/UL — SIGNIFICANT CHANGE UP (ref 0–0.9)
MONOCYTES NFR BLD AUTO: 6.7 % — SIGNIFICANT CHANGE UP (ref 2–14)
NEUTROPHILS # BLD AUTO: 8.37 K/UL — HIGH (ref 1.8–7.4)
NEUTROPHILS NFR BLD AUTO: 79.5 % — HIGH (ref 43–77)
NRBC # BLD: 0 /100 WBCS — SIGNIFICANT CHANGE UP (ref 0–0)
NRBC # FLD: 0 K/UL — SIGNIFICANT CHANGE UP (ref 0–0)
PHOSPHATE SERPL-MCNC: 3.6 MG/DL — SIGNIFICANT CHANGE UP (ref 2.5–4.5)
PLATELET # BLD AUTO: 235 K/UL — SIGNIFICANT CHANGE UP (ref 150–400)
POTASSIUM SERPL-MCNC: 3.9 MMOL/L — SIGNIFICANT CHANGE UP (ref 3.5–5.3)
POTASSIUM SERPL-SCNC: 3.9 MMOL/L — SIGNIFICANT CHANGE UP (ref 3.5–5.3)
RBC # BLD: 4.28 M/UL — SIGNIFICANT CHANGE UP (ref 3.8–5.2)
RBC # FLD: 13.6 % — SIGNIFICANT CHANGE UP (ref 10.3–14.5)
SODIUM SERPL-SCNC: 136 MMOL/L — SIGNIFICANT CHANGE UP (ref 135–145)
WBC # BLD: 10.53 K/UL — HIGH (ref 3.8–10.5)
WBC # FLD AUTO: 10.53 K/UL — HIGH (ref 3.8–10.5)

## 2024-03-27 RX ORDER — ACETAMINOPHEN 500 MG
975 TABLET ORAL EVERY 6 HOURS
Refills: 0 | Status: DISCONTINUED | OUTPATIENT
Start: 2024-03-27 | End: 2024-03-28

## 2024-03-27 RX ORDER — IBUPROFEN 200 MG
600 TABLET ORAL EVERY 6 HOURS
Refills: 0 | Status: DISCONTINUED | OUTPATIENT
Start: 2024-03-27 | End: 2024-03-28

## 2024-03-27 RX ORDER — SERTRALINE 25 MG/1
75 TABLET, FILM COATED ORAL DAILY
Refills: 0 | Status: DISCONTINUED | OUTPATIENT
Start: 2024-03-27 | End: 2024-03-28

## 2024-03-27 RX ADMIN — Medication 400 MILLIGRAM(S): at 00:00

## 2024-03-27 RX ADMIN — HEPARIN SODIUM 5000 UNIT(S): 5000 INJECTION INTRAVENOUS; SUBCUTANEOUS at 20:30

## 2024-03-27 RX ADMIN — Medication 400 MILLIGRAM(S): at 14:07

## 2024-03-27 RX ADMIN — Medication 400 MILLIGRAM(S): at 05:46

## 2024-03-27 RX ADMIN — ONDANSETRON 4 MILLIGRAM(S): 8 TABLET, FILM COATED ORAL at 09:27

## 2024-03-27 RX ADMIN — SIMETHICONE 80 MILLIGRAM(S): 80 TABLET, CHEWABLE ORAL at 12:19

## 2024-03-27 RX ADMIN — Medication 975 MILLIGRAM(S): at 23:46

## 2024-03-27 RX ADMIN — Medication 15 MILLIGRAM(S): at 11:20

## 2024-03-27 RX ADMIN — SERTRALINE 75 MILLIGRAM(S): 25 TABLET, FILM COATED ORAL at 14:58

## 2024-03-27 RX ADMIN — TIOTROPIUM BROMIDE 2 PUFF(S): 18 CAPSULE ORAL; RESPIRATORY (INHALATION) at 12:20

## 2024-03-27 RX ADMIN — HEPARIN SODIUM 5000 UNIT(S): 5000 INJECTION INTRAVENOUS; SUBCUTANEOUS at 04:00

## 2024-03-27 RX ADMIN — Medication 1000 MILLIGRAM(S): at 07:42

## 2024-03-27 RX ADMIN — Medication 1000 MILLIGRAM(S): at 05:39

## 2024-03-27 RX ADMIN — Medication 15 MILLIGRAM(S): at 10:42

## 2024-03-27 RX ADMIN — HEPARIN SODIUM 5000 UNIT(S): 5000 INJECTION INTRAVENOUS; SUBCUTANEOUS at 12:20

## 2024-03-27 NOTE — PHYSICAL THERAPY INITIAL EVALUATION ADULT - PATIENT PROFILE REVIEW, REHAB EVAL
Eval Received | Chart Reviewed | Pt is agreeable to participate | ACTIVITY: AMBULATE AS TOLERATED/yes Lyndsayal Received | Chart Reviewed | Pt is agreeable to participate | ACTIVITY: AMBULATE AS TOLERATED | IRIS Jorge cleared pt for OOB activity/yes

## 2024-03-27 NOTE — PHYSICAL THERAPY INITIAL EVALUATION ADULT - IMPAIRMENTS FOUND, PT EVAL
decreased midline orientation/ergonomics and body mechanics/gait, locomotion, and balance/gross motor/neuromotor development and sensory integration/posture

## 2024-03-27 NOTE — PHYSICAL THERAPY INITIAL EVALUATION ADULT - IMPAIRMENTS CONTRIBUTING IMPAIRED BED MOBILITY, REHAB EVAL
impaired coordination impaired coordination/decreased flexibility/impaired motor control/impaired postural control

## 2024-03-27 NOTE — PHYSICAL THERAPY INITIAL EVALUATION ADULT - PERTINENT HX OF CURRENT PROBLEM, REHAB EVAL
Pt is a 73 year old female presented with right lower quadrant abdominal pain. 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 Pt admitted for bilateral salpingo oophorectomy & possible hysterectomy.

## 2024-03-27 NOTE — PHYSICAL THERAPY INITIAL EVALUATION ADULT - IMPAIRMENTS CONTRIBUTING TO GAIT DEVIATIONS, PT EVAL
impaired balance/impaired coordination/decreased flexibility/impaired motor control/narrow base of support/impaired postural control

## 2024-03-27 NOTE — PHYSICAL THERAPY INITIAL EVALUATION ADULT - GENERAL OBSERVATIONS, REHAB EVAL
Upon entry, pt received laying semisupine in bed in NAD; daughter present at bedside. Pt HR 86 BPM. Pt left seated in bedside recliner in NAD & call bell within reach.
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
## 2024-03-27 NOTE — PHYSICAL THERAPY INITIAL EVALUATION ADULT - ADDITIONAL COMMENTS
Pt lives alone in a multi-family house with 2 steps to enter & 1 railing; 1 flight of stairs to reach her 2nd floor apartment. Her daughter lives in the 1st floor apartment; will take 2 weeks off to assist mother. Pt lives alone in a multi-family house with 2 steps to enter & 1 railing; 1 flight of stairs to reach her 2nd floor apartment. Her daughter lives in the 1st floor apartment; will take 2 weeks off to assist mother.    Pt left seated in bedside recliner in NAD & call bell within reach; daughter present at bedside; IRIS Jorge made aware of PT evaluation.

## 2024-03-27 NOTE — PROGRESS NOTE ADULT - NSPROGADDITIONALINFOA_GEN_ALL_CORE
Gyn Onc Fellow Addendum:    Pt seen and examined at bedside. Agree with above.    VS reviewed  Labs reviewed    Hemodynamically stable   Continue current pain regimen  Reg diet   Devi out today, f/u TOV   Encourage ambulation and IS use  Replete lytes prn  DVT ppx: HSQ, Venodynes   Dispo: cont inpt mgmt     Candy Cerda MD

## 2024-03-27 NOTE — PHYSICAL THERAPY INITIAL EVALUATION ADULT - GAIT DEVIATIONS NOTED, PT EVAL
decreased humera/decreased velocity of limb motion/decreased step length/decreased stride length/increased stride width/decreased weight-shifting ability Downward gaze/decreased humera/decreased velocity of limb motion/decreased step length/decreased stride length/increased stride width/decreased weight-shifting ability

## 2024-03-27 NOTE — PROGRESS NOTE ADULT - ATTENDING COMMENTS
73y  POD#1 s/p ex-lap, bilateral salpingo-oophorectomy. Patient stable and progressing post-operatively.     -AVSS, labs reviewed. Afebrile, appropriate postop leukocytosis. Replete lytes as indicated.  -Hct stable and on SQH ppx.    -Pain well controlled with current pain medication regimen.  -Tolerating regular diet, passing some flatus. Abdominal binder in situ.  - Devi discontinued follow up TOV.  -Ambulation and IS encouraged.  -Plan for continued inpatient management for postoperative care.  -All questions answered to patients satisfaction.    Jeanie Rosas MD      Division of Gynecologic Oncology    Department of Obstetrics and Gynecology  [employer] [facility] [employer]    [facility]

## 2024-03-28 ENCOUNTER — TRANSCRIPTION ENCOUNTER (OUTPATIENT)
Age: 74
End: 2024-03-28

## 2024-03-28 VITALS
OXYGEN SATURATION: 100 % | SYSTOLIC BLOOD PRESSURE: 128 MMHG | DIASTOLIC BLOOD PRESSURE: 63 MMHG | RESPIRATION RATE: 18 BRPM | TEMPERATURE: 98 F | HEART RATE: 79 BPM

## 2024-03-28 DIAGNOSIS — Z98.890 OTHER SPECIFIED POSTPROCEDURAL STATES: ICD-10-CM

## 2024-03-28 RX ORDER — OXYCODONE HYDROCHLORIDE 5 MG/1
1 TABLET ORAL
Qty: 15 | Refills: 0
Start: 2024-03-28

## 2024-03-28 RX ORDER — IBUPROFEN 200 MG
1 TABLET ORAL
Qty: 0 | Refills: 0 | DISCHARGE

## 2024-03-28 RX ORDER — TIOTROPIUM BROMIDE 18 UG/1
2 CAPSULE ORAL; RESPIRATORY (INHALATION)
Refills: 0 | DISCHARGE

## 2024-03-28 RX ADMIN — HEPARIN SODIUM 5000 UNIT(S): 5000 INJECTION INTRAVENOUS; SUBCUTANEOUS at 05:43

## 2024-03-28 RX ADMIN — Medication 975 MILLIGRAM(S): at 12:14

## 2024-03-28 RX ADMIN — Medication 975 MILLIGRAM(S): at 13:14

## 2024-03-28 RX ADMIN — Medication 975 MILLIGRAM(S): at 00:46

## 2024-03-28 RX ADMIN — SIMETHICONE 80 MILLIGRAM(S): 80 TABLET, CHEWABLE ORAL at 12:41

## 2024-03-28 NOTE — PROGRESS NOTE ADULT - SUBJECTIVE AND OBJECTIVE BOX
Gyn ONC Progress Note POD #2 HD#3    Subjective:   Patient seen and examined at bedside.  No acute events overnight. No acute complaints.  Pain well controlled.  Patient is ambulating and tolerating regular diet. Patient is passing flatus.    Patient is voiding spontaneously.     Objective:  T(F): 98.5 (03-28-24 @ 05:42), Max: 99.1 (03-27-24 @ 10:09)  HR: 80 (03-28-24 @ 05:42) (69 - 80)  BP: 139/75 (03-28-24 @ 05:42) (98/51 - 139/75)  RR: 18 (03-28-24 @ 05:42) (17 - 18)  SpO2: 95% (03-28-24 @ 05:42) (95% - 99%)  Wt(kg): --  I&O's Summary    26 Mar 2024 07:01  -  27 Mar 2024 07:00  --------------------------------------------------------  IN: 750 mL / OUT: 1515 mL / NET: -765 mL    27 Mar 2024 07:01  -  28 Mar 2024 06:44  --------------------------------------------------------  IN: 1495 mL / OUT: 1840 mL / NET: -345 mL      CAPILLARY BLOOD GLUCOSE          MEDICATIONS  (STANDING):  heparin   Injectable 5000 Unit(s) SubCutaneous every 8 hours  loratadine 10 milliGRAM(s) Oral daily  montelukast 10 milliGRAM(s) Oral at bedtime  sertraline 75 milliGRAM(s) Oral daily  simethicone 80 milliGRAM(s) Chew daily  tiotropium 2.5 MICROgram(s) Inhaler 2 Puff(s) Inhalation daily    MEDICATIONS  (PRN):  acetaminophen     Tablet .. 975 milliGRAM(s) Oral every 6 hours PRN Moderate Pain (4 - 6)  ibuprofen  Tablet. 600 milliGRAM(s) Oral every 6 hours PRN Moderate Pain (4 - 6)  ondansetron Injectable 4 milliGRAM(s) IV Push every 6 hours PRN Nausea and/or Vomiting  oxyCODONE    IR 10 milliGRAM(s) Oral every 6 hours PRN Severe Pain (7 - 10)  oxyCODONE    IR 5 milliGRAM(s) Oral every 6 hours PRN Severe Pain (7 - 10)  senna 2 Tablet(s) Oral at bedtime PRN Constipation      Physical Exam:  Constitutional: NAD  Lungs: breathing well on RA  Abdomen: soft, nondistended, no guarding, no rebound, normal bowel sounds  Incision: midline vertical incision with Dermabond Prineo in place, 2-3cm area of ecchymosis at the L lower edge of incision, otherwise clean, dry, intact  Extremities: no lower extremity edema or calf tenderness bilaterally    LABS:    03-27    136    |  104    |  14     ----------------------------<  108<H>  3.9     |  21<L>  |  0.66     Ca    9.4        27 Mar 2024 05:35  Phos  3.6       03-27  Mg     1.70      03-27            Urinalysis Basic - ( 27 Mar 2024 05:35 )    Color: x / Appearance: x / SG: x / pH: x  Gluc: 108 mg/dL / Ketone: x  / Bili: x / Urobili: x   Blood: x / Protein: x / Nitrite: x   Leuk Esterase: x / RBC: x / WBC x   Sq Epi: x / Non Sq Epi: x / Bacteria: x    
Gyn ONC Progress Note POD #1 HD#2    Subjective:   Patient seen and examined at bedside.  Patient with emesis yesterday evening. Nausea resolved. No further emesis overnight. Pain overall controlled.  Patient is not yet OOB  Has not yet passed flatus. Pradhan is still in place.       Objective:  T(F): 97.9 (03-27-24 @ 05:52), Max: 98.2 (03-26-24 @ 22:04)  HR: 75 (03-27-24 @ 05:52) (72 - 92)  BP: 125/64 (03-27-24 @ 05:52) (125/64 - 171/85)  RR: 16 (03-27-24 @ 05:52) (12 - 19)  SpO2: 96% (03-27-24 @ 05:52) (93% - 99%)  Wt(kg): --  I&O's Summary    26 Mar 2024 07:01  -  27 Mar 2024 07:00  --------------------------------------------------------  IN: 750 mL / OUT: 1515 mL / NET: -765 mL      CAPILLARY BLOOD GLUCOSE      POCT Blood Glucose.: 90 mg/dL (26 Mar 2024 09:55)      MEDICATIONS  (STANDING):  acetaminophen   IVPB .. 1000 milliGRAM(s) IV Intermittent every 6 hours  heparin   Injectable 5000 Unit(s) SubCutaneous every 8 hours  lactated ringers. 1000 milliLiter(s) (125 mL/Hr) IV Continuous <Continuous>  loratadine 10 milliGRAM(s) Oral daily  montelukast 10 milliGRAM(s) Oral at bedtime  sertraline 150 milliGRAM(s) Oral daily  simethicone 80 milliGRAM(s) Chew daily  tiotropium 2.5 MICROgram(s) Inhaler 2 Puff(s) Inhalation daily    MEDICATIONS  (PRN):  ketorolac   Injectable 15 milliGRAM(s) IV Push every 6 hours PRN Severe Pain (7 - 10)  ondansetron Injectable 4 milliGRAM(s) IV Push every 6 hours PRN Nausea and/or Vomiting  oxyCODONE    IR 10 milliGRAM(s) Oral every 6 hours PRN Severe Pain (7 - 10)  oxyCODONE    IR 5 milliGRAM(s) Oral every 6 hours PRN Severe Pain (7 - 10)  senna 2 Tablet(s) Oral at bedtime PRN Constipation      Physical Exam:  Constitutional: NAD  Abdomen: soft, nondistended, no guarding, no rebound, normal bowel sounds  Incision: midline vertical incision with Dermabond Prineo in place, 2cm area of erythema to the L of incision, no induration, discharge or fluctance, incision otherwise clean, dry, intact  : pradhan in place  Extremities: no lower extremity edema or calf tenderness bilaterally; venodynes in place    LABS:    03-27    136    |  104    |  14     ----------------------------<  108<H>  3.9     |  21<L>  |  0.66     Ca    9.4        27 Mar 2024 05:35  Phos  3.6       03-27  Mg     1.70      03-27            Urinalysis Basic - ( 27 Mar 2024 05:35 )    Color: x / Appearance: x / SG: x / pH: x  Gluc: 108 mg/dL / Ketone: x  / Bili: x / Urobili: x   Blood: x / Protein: x / Nitrite: x   Leuk Esterase: x / RBC: x / WBC x   Sq Epi: x / Non Sq Epi: x / Bacteria: x

## 2024-03-28 NOTE — PROGRESS NOTE ADULT - ASSESSMENT
A/P: 73y  POD#1 s/p ex-lap, bilateral salpingo-oophorectomy. Patient stable and progressing post-operatively.      Neuro: pain well controlled on IV tylenol, IV toradol, oxy prn, transition to PO tylenol, motrin today    #Hx of Anx/Panic  -continue home sertraline 150mg  CV: hemodynamically stable, Hct:44.2->38.3  Pulm: O2 sat WNL on RA, increase ambulation, encourage incentive spirometry use  #Hx Asthma   - C/w Home Tiotropium Montelukast, Loratadine   GI: continue to advance  to regular diet as tolerated, simethicone, senna, zofran prn  : UOP adequate, 126cc/hr, d/c pradhan this AM   Heme: HSQ and SCDs while in bed for DVT ppx  ID: afebrile, no signs of infection  Endo: No active issues  Fe: Replete electrolytes as needed, LR@125, consider SLIV today if PO intake improves       Dispo: continue routine post-op care    Seen with GYN Onc Team    Melanie Rodriguez, PGY1  
A/P: 73y  POD#2 s/p ex-lap, bilateral salpingo-oophorectomy. Patient stable and continuing to progress post-operatively.     Neuro: pain well controlled on PO tylenol, PO motrin    #Hx of Anx/Panic  -continue home sertraline 150mg  CV: hemodynamically stable, Hct:44.2->38.3  Pulm: O2 sat WNL on RA, increase ambulation, encourage incentive spirometry use  #Hx Asthma   - C/w Home Tiotropium Montelukast, Loratadine   GI: continue to advance  to regular diet as tolerated, simethicone, senna, zofran prn  : 10cc/hr, encourage PO hydration  Heme: HSQ and SCDs while in bed for DVT ppx  ID: afebrile, no signs of infection  Endo: No active issues  Fe:  SLIV     Dispo: Discharge planning    Seen with GYN ONC Team  Melanie Rodrgiuez, PGY1

## 2024-03-28 NOTE — CHART NOTE - NSCHARTNOTEFT_GEN_A_CORE
PA POST OP NOTE:       SUBJECTIVE:  Patient seen and examined at bedside. Patient was asleep but arousable. Reports mod pain earlier. Medicated for pain with good relief. Denies c/o nausea, vomiting, sob, cp or palpitations. Pt has not yet attempted PO. Gilmore in place.    Vital Signs Last 24 Hrs  T(C): 36.3 (26 Mar 2024 16:00), Max: 36.6 (26 Mar 2024 09:46)  T(F): 97.4 (26 Mar 2024 16:00), Max: 97.8 (26 Mar 2024 09:46)  HR: 79 (26 Mar 2024 16:15) (72 - 89)  BP: 129/69 (26 Mar 2024 16:15) (127/68 - 171/85)  BP(mean): 83 (26 Mar 2024 16:15) (82 - 136)  RR: 12 (26 Mar 2024 16:15) (12 - 19)  SpO2: 97% (26 Mar 2024 16:15) (95% - 99%)    Parameters below as of 26 Mar 2024 16:15  Patient On (Oxygen Delivery Method): nasal cannula          PHYSICAL EXAM:    LUNGS: Clear to auscultation bilaterally; No wheezing.  HEART: Regular, rate and rhythm; No murmurs.  ABDOMEN: Soft, decreased BS, nondistended and appropriately tender on palpation.  INCISION:  Vertical incision with Dermabond  Dressing intact, clean and dry.  EXTREMITIES: No swelling or calf tenderness bilaterally. Venodynes in place.  GILMORE: Urine clear.       MEDICATIONS  (STANDING):  acetaminophen   IVPB .. 1000 milliGRAM(s) IV Intermittent every 6 hours  chlorhexidine 2% Cloths 1 Application(s) Topical daily  heparin   Injectable 5000 Unit(s) SubCutaneous every 8 hours  lactated ringers. 1000 milliLiter(s) (125 mL/Hr) IV Continuous <Continuous>  lactated ringers. 1000 milliLiter(s) (30 mL/Hr) IV Continuous <Continuous>  simethicone 80 milliGRAM(s) Chew daily    MEDICATIONS  (PRN):  HYDROmorphone  Injectable 0.5 milliGRAM(s) IV Push every 10 minutes PRN Moderate Pain (4 - 6)  ketorolac   Injectable 15 milliGRAM(s) IV Push every 6 hours PRN Severe Pain (7 - 10)  ondansetron Injectable 4 milliGRAM(s) IV Push every 6 hours PRN Nausea and/or Vomiting  ondansetron Injectable 4 milliGRAM(s) IV Push once PRN Nausea and/or Vomiting  oxyCODONE    IR 5 milliGRAM(s) Oral every 6 hours PRN Severe Pain (7 - 10)  oxyCODONE    IR 10 milliGRAM(s) Oral every 6 hours PRN Severe Pain (7 - 10)  senna 2 Tablet(s) Oral at bedtime PRN Constipation      ASSESMENT/PLAN: 72y/o POD#0  from Exlap BSO in stable condition.    1.Clears to Regular diet as tolerate.  2. IVF: RL @125cc/hr.  3. Activity: Out of bed with assist.  4. Vital Signs Q routine.  5. Pulm:  pulse Ox monitoring and encourage incentive spirometer use.  6.  Pain meds as ordered.  7. LABS: CBC+BMP in AM.  8. PT consult ordered.  9. Gilmore to gravity. Eval for removal in AM.  10. Continue Heparin SQ and Venodynes for DVT prophylaxis.  11. Admit to floor and continue routine post op care.
Patient evaluated at bedside this afternoon. Denies complaints. + OOB, Tolerating diet. Voiding spontaneously. Pain controlled. Passing flatus.    Objective  T(C): 36.6 (03-27-24 @ 14:10), Max: 37.3 (03-27-24 @ 10:09)  HR: 79 (03-27-24 @ 14:10) (74 - 79)  BP: 98/51 (03-27-24 @ 14:10) (98/51 - 125/64)  RR: 17 (03-27-24 @ 14:10) (16 - 17)  SpO2: 97% (03-27-24 @ 14:10) (96% - 97%)  Wt(kg): --    03-26 @ 07:01  -  03-27 @ 07:00  --------------------------------------------------------  IN: 750 mL / OUT: 1515 mL / NET: -765 mL    03-27 @ 07:01  -  03-27 @ 17:29  --------------------------------------------------------  IN: 1000 mL / OUT: 440 mL / NET: 560 mL        Gen: NAD      heparin   Injectable 5000 Unit(s) SubCutaneous every 8 hours  ketorolac   Injectable 15 milliGRAM(s) IV Push every 6 hours PRN  lactated ringers. 1000 milliLiter(s) IV Continuous <Continuous>  loratadine 10 milliGRAM(s) Oral daily  montelukast 10 milliGRAM(s) Oral at bedtime  ondansetron Injectable 4 milliGRAM(s) IV Push every 6 hours PRN  oxyCODONE    IR 5 milliGRAM(s) Oral every 6 hours PRN  oxyCODONE    IR 10 milliGRAM(s) Oral every 6 hours PRN  senna 2 Tablet(s) Oral at bedtime PRN  sertraline 75 milliGRAM(s) Oral daily  simethicone 80 milliGRAM(s) Chew daily  tiotropium 2.5 MICROgram(s) Inhaler 2 Puff(s) Inhalation daily    73y  POD#1 s/p ex-lap, bilateral salpingo-oophorectomy. Patient stable and progressing post-operatively.     Interval Events:     - Pain controlled   - OOB   - Passed trial of void  - Tolerating regular diet   - Return of bowel function  - Consider discharge tomorrow pending continued post-operative progression    Seen with GYN ONC Team  Melanie Rodriguez, PGY1
Patient will require a rolling walker to be able to perform their MRADLs within their home.

## 2024-03-28 NOTE — DISCHARGE NOTE NURSING/CASE MANAGEMENT/SOCIAL WORK - NSDCPEFALRISK_GEN_ALL_CORE
For information on Fall & Injury Prevention, visit: [email or web address] OR  [email or web address] OR  https://www.cdc.gov/steadi/patient.html

## 2024-03-28 NOTE — PROGRESS NOTE ADULT - ATTENDING COMMENTS
Patient seen and examined, agree with gyn onc fellow and resident  POD#2  Doing well  Routine postop care  Labs stable  Exam benign  Plan d/c home in PM

## 2024-03-28 NOTE — PROGRESS NOTE ADULT - PROBLEM SELECTOR PLAN 1
Gyn Onc Fellow Addendum:    Pt seen and examined at bedside. Agree with above.    VS reviewed  Labs reviewed    Hemodynamically stable   Continue current pain regimen  Reg diet   Voiding spontaneously   Encourage ambulation and IS use   DVT ppx: HSQ, Venraqueles   Dispo: anticipate discharge home today     Candy Cerda MD

## 2024-03-28 NOTE — DISCHARGE NOTE NURSING/CASE MANAGEMENT/SOCIAL WORK - PATIENT PORTAL LINK FT
You can access the FollowMyHealth Patient Portal offered by [facility] by registering at the following website: [email or web address]. By joining [facility]’s FollowMyHealth portal, you will also be able to view your health information using other applications (apps) compatible with our system.

## 2024-03-29 ENCOUNTER — NON-APPOINTMENT (OUTPATIENT)
Age: 74
End: 2024-03-29

## 2024-04-01 LAB — NON-GYNECOLOGICAL CYTOLOGY STUDY: SIGNIFICANT CHANGE UP

## 2024-04-11 ENCOUNTER — NON-APPOINTMENT (OUTPATIENT)
Age: 74
End: 2024-04-11

## 2024-04-12 ENCOUNTER — APPOINTMENT (OUTPATIENT)
Dept: GYNECOLOGIC ONCOLOGY | Facility: CLINIC | Age: 74
End: 2024-04-12
Payer: MEDICARE

## 2024-04-12 VITALS
BODY MASS INDEX: 24.49 KG/M2 | HEIGHT: 65 IN | TEMPERATURE: 97.3 F | HEART RATE: 81 BPM | WEIGHT: 147 LBS | DIASTOLIC BLOOD PRESSURE: 93 MMHG | SYSTOLIC BLOOD PRESSURE: 146 MMHG

## 2024-04-12 LAB — SURGICAL PATHOLOGY STUDY: SIGNIFICANT CHANGE UP

## 2024-04-12 PROCEDURE — 99024 POSTOP FOLLOW-UP VISIT: CPT

## 2024-04-12 NOTE — REASON FOR VISIT
[Post Op] : post op visit [de-identified] : 3/26/24 [de-identified] : Exploratory laparotomy and open bilateral salpingo-oophorectomy.   [de-identified] : Normal bowel function. Normal bladder function. No vaginal bleeding or malodorous discharge. No fevers/chills.  Incision without concern. Recovering well.  She is having left sided abdominal pain, without distention or erythema or any other concerns.  She had one episode of BRBPR, likely related to a known hemorrhoid from straining.

## 2024-04-12 NOTE — DISCUSSION/SUMMARY
[Clean] : was clean [Dry] : was dry [Intact] : was intact [Sutures Intact] : had sutures  intact [None] : had no drainage [Normal Skin] : normal appearance [Normal Skin Turgor] : normal skin turgor [Doing Well] : is doing well [Excellent Pain Control] : has excellent pain control [No Sign of Infection] : is showing no signs of infection [0] : 0 [Reviewed] : reviewed [Erythema] : was not erythematous [Ecchymosis] : was not ecchymotic [Seroma] : had no seroma [Firm] : soft [Tender] : nontender [Rebound] : no rebound tenderness [Guarding] : no guarding [Incisional Hernia] : no incisional hernia [Mass] : no palpable mass [de-identified] : deferred [de-identified] : gu/gi function wnl [de-identified] : postoperative recuperation  [FreeTextEntry1] : .   Right ovary and fallopian tube - Ovary with serous cystadenofibroma - Benign fallopian tube with reactive changes, see note   Note:  The fallopian tube shows focal atypia however an immunohistochemical stain for p53 is heterogeneous (wild type) and the Ki-67 proliferation index is within normal limits, supporting a benign reactive process.  Ki67 and p53 pending on block IJ  2.   Left ovary and fallopian tube - Benign ovary with stromal hyperplasia and simple cyst - Benign fallopian tube

## 2024-04-12 NOTE — ASSESSMENT
[FreeTextEntry1] : Healing well  Discussed pain management as the pain seems to be with movement and MS.  Recommended Ibuprofen 600 mg 3x per day for a week to see if pain subsides.  heating pad also suggested.   Discussed ongoing recuperation. Reviewed Final pathology results in detail. A copy was given to the patient. We reviewed recommended surveillance plan follow up with regular GYN. Patient stated and expressed a good understanding of the above information. RTO in 2 weeks

## 2024-04-26 ENCOUNTER — APPOINTMENT (OUTPATIENT)
Dept: GYNECOLOGIC ONCOLOGY | Facility: CLINIC | Age: 74
End: 2024-04-26
Payer: MEDICARE

## 2024-04-29 ENCOUNTER — APPOINTMENT (OUTPATIENT)
Dept: GYNECOLOGIC ONCOLOGY | Facility: CLINIC | Age: 74
End: 2024-04-29
Payer: MEDICARE

## 2024-04-29 VITALS
DIASTOLIC BLOOD PRESSURE: 77 MMHG | SYSTOLIC BLOOD PRESSURE: 118 MMHG | BODY MASS INDEX: 24.49 KG/M2 | WEIGHT: 147 LBS | TEMPERATURE: 98 F | HEART RATE: 81 BPM | HEIGHT: 65 IN

## 2024-04-29 DIAGNOSIS — R19.00 INTRA-ABDOMINAL AND PELVIC SWELLING, MASS AND LUMP, UNSPECIFIED SITE: ICD-10-CM

## 2024-04-29 PROCEDURE — 99024 POSTOP FOLLOW-UP VISIT: CPT

## 2024-04-29 NOTE — REASON FOR VISIT
[de-identified] : 3/26/24 [de-identified] : Exploratory laparotomy and open bilateral salpingo-oophorectomy.   [de-identified] : Normal bladder function. No vaginal bleeding or malodorous discharge. No fevers/chills.  Incision without concern. Recovering well.  She is having left sided abdominal pain, without distention or erythema or any other concerns.  She still has some constipation using Miralax and Senna.

## 2024-04-29 NOTE — DISCUSSION/SUMMARY
[Erythema] : was not erythematous [Ecchymosis] : was not ecchymotic [Seroma] : had no seroma [Firm] : soft [Tender] : nontender [Rebound] : no rebound tenderness [Guarding] : no guarding [Incisional Hernia] : no incisional hernia [Mass] : no palpable mass [de-identified] : deferred [de-identified] : gu/gi function wnl [de-identified] : postoperative recuperation  [FreeTextEntry1] : .   Right ovary and fallopian tube - Ovary with serous cystadenofibroma - Benign fallopian tube with reactive changes, see note   Note:  The fallopian tube shows focal atypia however an immunohistochemical stain for p53 is heterogeneous (wild type) and the Ki-67 proliferation index is within normal limits, supporting a benign reactive process.  Ki67 and p53 pending on block IJ  2.   Left ovary and fallopian tube - Benign ovary with stromal hyperplasia and simple cyst - Benign fallopian tube

## 2024-04-29 NOTE — ASSESSMENT
[FreeTextEntry1] : Healing well  Discussed pain management as the pain is improved. Discussed ongoing recuperation. A copy was given to the patient. We reviewed recommended surveillance plan follow up with regular GYN. Bowel Regimen discussed.  Patient stated and expressed a good understanding of the above information.

## 2024-05-08 RX ORDER — FLUTICASONE PROPIONATE AND SALMETEROL 250; 50 UG/1; UG/1
250-50 POWDER RESPIRATORY (INHALATION)
Qty: 3 | Refills: 1 | Status: ACTIVE | COMMUNITY
Start: 2019-03-21 | End: 1900-01-01

## 2024-06-05 NOTE — ED ADULT NURSE NOTE - NSFALLRISKASMT_ED_ALL_ED_DT
SPOKE TO MOM SHE IS ASKING FOR REFILL ON FOCALIN TO BE SENT TO PHARMACY, MOM STATES [facility] TOOK LAST PILL TODAY AND IS OUT, ADVISED MOM TO LET US HAVE AT LEAST A WEEK OR SO NOTICE FOR REFILLS SO THERE IS NO INTERRUPTION IN TAKING MEDICATION. REMINDED MOM OF APPT ON 6/7. PLEASE ADVISE, THANK YOU.   01-Mar-2024 10:54

## 2024-08-12 ENCOUNTER — APPOINTMENT (OUTPATIENT)
Dept: PULMONOLOGY | Facility: CLINIC | Age: 74
End: 2024-08-12
Payer: MEDICARE

## 2024-08-12 VITALS
OXYGEN SATURATION: 97 % | DIASTOLIC BLOOD PRESSURE: 70 MMHG | HEART RATE: 84 BPM | SYSTOLIC BLOOD PRESSURE: 138 MMHG | TEMPERATURE: 97.4 F | BODY MASS INDEX: 21.83 KG/M2 | WEIGHT: 131 LBS | HEIGHT: 65 IN | RESPIRATION RATE: 16 BRPM

## 2024-08-12 DIAGNOSIS — J45.50 SEVERE PERSISTENT ASTHMA, UNCOMPLICATED: ICD-10-CM

## 2024-08-12 DIAGNOSIS — R76.8 OTHER SPECIFIED ABNORMAL IMMUNOLOGICAL FINDINGS IN SERUM: ICD-10-CM

## 2024-08-12 DIAGNOSIS — R06.02 SHORTNESS OF BREATH: ICD-10-CM

## 2024-08-12 DIAGNOSIS — Z87.42 PERSONAL HISTORY OF OTHER DISEASES OF THE FEMALE GENITAL TRACT: ICD-10-CM

## 2024-08-12 DIAGNOSIS — Z86.2 PERSONAL HISTORY OF DISEASES OF THE BLOOD AND BLOOD-FORMING ORGANS AND CERTAIN DISORDERS INVOLVING THE IMMUNE MECHANISM: ICD-10-CM

## 2024-08-12 DIAGNOSIS — J30.9 ALLERGIC RHINITIS, UNSPECIFIED: ICD-10-CM

## 2024-08-12 DIAGNOSIS — J82.83 EOSINOPHILIC ASTHMA: ICD-10-CM

## 2024-08-12 DIAGNOSIS — I31.39 OTHER PERICARDIAL EFFUSION (NONINFLAMMATORY): ICD-10-CM

## 2024-08-12 PROCEDURE — 94729 DIFFUSING CAPACITY: CPT

## 2024-08-12 PROCEDURE — 99214 OFFICE O/P EST MOD 30 MIN: CPT | Mod: 25

## 2024-08-12 PROCEDURE — 94010 BREATHING CAPACITY TEST: CPT

## 2024-08-12 PROCEDURE — 94727 GAS DIL/WSHOT DETER LNG VOL: CPT

## 2024-08-12 PROCEDURE — 95012 NITRIC OXIDE EXP GAS DETER: CPT

## 2024-08-12 PROCEDURE — ZZZZZ: CPT

## 2024-08-12 NOTE — HISTORY OF PRESENT ILLNESS
[FreeTextEntry1] : Ms. Henson is a 73 year old female with a history of allergic rhinitis, elevated IgE, eosinophilic asthma, severe persistent asthma, and SOB presents into the office today for a follow-up pulmonary evaluation. Her chief complaint is  -she notes having surgery 4 months ago for ovarian cyst ([facility]adou) -she notes losing hair s/p surgery - attributed to anesthesia according to Dermatologist  -she denies dysphagia  -she notes vision is stable -she notes her senses of smell and taste are stable -she notes exercising (bike) -she notes losing weight (17 lbs) post-surgery  -she notes appetite is stable   -she denies any headaches, nausea, emesis, fever, chills, sweats, chest pain, chest pressure, coughing, wheezing, palpitations, diarrhea, constipation, dysphagia, vertigo, arthralgias, myalgias, leg swelling, itchy eyes, itchy ears, heartburn, reflux, or sour taste in the mouth.

## 2024-08-12 NOTE — PROCEDURE
[FreeTextEntry1] : Full PFT reveals mild obstructive dysfunction at mid-low volumes; FEV1 was 1.83L which is 91% of predicted; normal lung volumes; normal diffusion at 13.5, which is 79% of predicted; normal flow volume loop. PFTs were performed to evaluate for SOB  FENO was 18; a normal value being less than 25 Fractional exhaled nitric oxide (FENO) is regarded as a simple, noninvasive method for assessing eosinophilic airway inflammation. Produced by a variety of cells within the lung, nitric oxide (NO) concentrations are generally low in healthy individuals. However, high concentrations of NO appear to be involved in nonspecific host defense mechanisms and chronic inflammatory diseases such as asthma. The American Thoracic Society (ATS) therefore has recommended using FENO to aid in the diagnosis and monitoring of eosinophilic airway inflammation and asthma, and for identifying steroid responsive individuals whose chronic respiratory symptoms may be caused by airway inflammation.

## 2024-08-12 NOTE — ASSESSMENT
[FreeTextEntry1] : Ms. Henson is 73 y.o F who has a history of eosinophilic asthma, elevated IgE, allergies, HLD covid-19 12/2022, and anxiety. s/p URI, bronchospasm 12/2023; s/p right ovary cystectomy surgery - pericardial effusion  Her shortness of breath is multifactorial due to: -poor mechanics of breathing -out of shape -pulmonary disease - asthma  problem 1: moderate persistent asthma (stable) -continue to use Advair 250 at 1 inhalation BID or Wixela 250 mg 1 inhalation BID -continue to use Spiriva 2 inhalations QD -continue to use Singulair 10 mg before bed  -Asthma is believed to be caused by inherited (genetic) and environmental factor, but its exact cause is unknown. Asthma may be triggered by allergens, lung infections, or irritants in the air. Asthma triggers are different for each person -Inhaler technique reviewed as well as oral hygiene techniques reviewed with patient. Avoidance of cold air, extremes of temperature, rescue inhaler should be used before exercise. Order of medication reviewed with patient. Recommended use of a cool mist humidifier in the bedroom.   problem 1A: Cardiac - pericardial effusion -complete formal Echo  problem 2: elevated IgE level and eosinophilia -she remains a candidate for Xolair and Nucala, respectfully -no need to implement at this point in time  -The safety and efficacy of Nucala was established in three double-blind, randomized, placebo-controlled trials in patients with severe asthma. Compared to a placebo, patients with severe asthma receiving Nucala had fewer exacerbation requiring hospitalization and/or emergency department visits, and a longer time to first exacerbation. In addition, patients with severe asthma receiving Nucala experienced greater reductions in their daily maintenance oral corticosteroid dose, while maintaining asthma control compared with patients receiving placebo. Treatment with Nucala did not result in a significant improvement in lung function, as measured by the volume of air exhaled by patients in one second. The most common side effects include: headache, injection site reactions, back pain, weakness, and fatigue; hypersensitivity reactions can occur within hours or days including swelling of the face, mouth, and tongue, fainting, dizziness, hives, breathing problems, and rash; herpes zoster infections have occurred. The drug is a monoclonal antibody that inhibits interleukin -5 which helps regular eosinophils, a type of white blood cell that contributes to asthma. The over-production of eosinophils can cause inflammation in the lungs, increasing the frequency of asthma attacks. Patients must also take other medications, including high dose inhaled corticosteroids and at least one additional asthma drug. -Xolair is a recombinant DNA- derived humanized IgG1K monoclonal antibody that selectively binds ot human immunoglobulin E (IgE). Xolair is produced by a Chinese hamster ovary cell suspension culture in nutrient medium containing the antibiotic gentamicin. Gentamicin is not detectable in the final product. Xolair is a sterile, white, preservative free, lyophilized powder contained in a single use vial that is reconstituted with sterile water for suspension. Side effects include: wheezing, tightness of the chest, trouble breathing, hives, skin rash, feeling anxious or light-headed, fainting, warmth or tingling under skin, or swelling of face, lips, or tongue  problem 3: allergic rhinitis (stable) -Allegra 180 mg QAM -continue to use Clarinex 5 mg QHS -recommended to use Xlear nasal saline spray -continue Astelin 0.15% BID -Environmental measures for allergies were encouraged including mattress and pillow cover, air purifier, and environmental controls.  problem 4: URI (if present) -recommended SaNOtize nasal spray -recommended to take Aida seltzer cold and sinus  problem 5: poor sleep (improved) Good sleep hygiene was encouraged including avoiding watching television an hour before bed, keeping caffeine at a low, avoiding reading, television, or anything, in bed, no drinking any liquids three hours before bedtime, and only getting into bed when tired and ready for sleep.  problem 6: poor breathing mechanics -Recommended [place] Popef and B[place]ko breathing techniques -Proper breathing techniques were reviewed with an emphasis of exhalation. Patient instructed to breath in for 1 second and out for four seconds. Patient was encouraged to not talk while walking.  problem 7: out of shape -Weight loss, exercise, and diet control were discussed and are highly encouraged. Treatment options were given such as, aqua therapy, and contacting a nutritionist. Recommended to use the elliptical, stationary bike, less use of treadmill. Mindful eating was explained to the patient Obesity is associated with worsening asthma, shortness of breath, and potential for cardiac disease, diabetes, and other underlying medical conditions  problem 8: anxiety -recommended to read: "The Gift of Maybe," by Marta Gonzalez  Problem 9: Health Maintenance/COVID19 Precautions: -recommended RSV vaccine -Covid-19 12/2022 - S/p Covid 19 vaccine (Moderna) x3 - Clean your hands often. Wash your hands often with soap and water for at least 20 seconds, especially after blowing your nose, coughing, or sneezing, or having been in a public place. - If soap and water are not available, use a hand  that contains at least 60% alcohol. - To the extent possible, avoid touching high-touch surfaces in public places - elevator buttons, door handles, handrails, handshaking with people, etc. Use a tissue or your sleeve to cover your hand or finger if you must touch something. - Wash your hands after touching surfaces in public places. - Avoid touching your face, nose, eyes, etc. - Clean and disinfect your home to remove germs: practice routine cleaning of frequently touched surfaces (for example: tables, doorknobs, light switches, handles, desks, toilets, faucets, sinks & cell phones) - Avoid crowds, especially in poorly ventilated spaces. Your risk of exposure to respiratory viruses like COVID-19 may increase in crowded, closed-in settings with little air circulation if there are people in the crowd who are sick. All patients are recommended to practice social distancing and stay at least 6 feet away from others. - Avoid all non-essential travel including plane trips, and especially avoid embarking on cruise ships. -If COVID-19 is spreading in your community, take extra measures to put distance between yourself and other people to further reduce your risk of being exposed to this new virus. -Stay home as much as possible. - Consider ways of getting food brought to your house through family, social, or commercial networks -Be aware that the virus has been known to live in the air up to 3 hours post exposure, cardboard up to 24 hours post exposure, copper up to 4 hours post exposure, steel and plastic up to 2-3 days post exposure. Risk of transmission from these surfaces are affected by many variables.  Immune Support Recommendations: -OTC Vitamin C 500mg BID -OTC Quercetin 250-500mg BID -OTC Zinc 75-100mg per day -OTC Melatonin 1or 2mg a night -OTC Vitamin D 1-4000mg per day -Tonic Water 8oz -Recommended to Stay Hydrated (At least a gallon/day)  Asthma and COVID19: You need to make sure your asthma is under control. This often requires the use of inhaled corticosteroids (and sometimes oral corticosteroids). Inhaled corticosteroids do not likely reduce your immune system's ability to fight infections, but oral corticosteroids may. It is important to use the steps above to protect yourself to limit your exposure to any respiratory virus.  problem 10: health maintenance -Recommend Mouth Kote -Recommended to take alpha lipoic acid and L-glutamine -s/p Shingrix vaccines x2 (completed 2023) -s/p yearly flu shot given in office 2023 -Pneumovax 23 given March 2018/ [id number] 11/2016 -she is being added CoQ-10 at 200 mg QD -recommended early intervention for URIs -recommended regular osteoporosis evaluations -recommended early dermatological evaluations -recommended after the age of 50 to the age of 70, colonoscopy every 5 years  F/P in 4-6 months with SPI and NiOx She is encouraged to call with any changes, concerns, or questions.

## 2024-08-12 NOTE — PHYSICAL EXAM
[No Acute Distress] : no acute distress [Normal Oropharynx] : normal oropharynx [III] : Mallampati Class: III [Normal Appearance] : normal appearance [No Neck Mass] : no neck mass [Normal Rate/Rhythm] : normal rate/rhythm [Normal S1, S2] : normal s1, s2 [No Murmurs] : no murmurs [No Resp Distress] : no resp distress [Clear to Auscultation Bilaterally] : clear to auscultation bilaterally [Kyphosis] : kyphosis [Benign] : benign [Normal Gait] : normal gait [No Clubbing] : no clubbing [No Cyanosis] : no cyanosis [No Edema] : no edema [FROM] : FROM [Normal Color/ Pigmentation] : normal color/ pigmentation [No Focal Deficits] : no focal deficits [Oriented x3] : oriented x3 [Normal Affect] : normal affect [TextBox_68] : I:E 1:3; Clear  [TextBox_80] : mild kyphosis

## 2024-08-12 NOTE — ADDENDUM
[FreeTextEntry1] : Documented by Wicho Perdue acting as a scribe for Dr. Dorian Rojas on 08/12/2024 All medical record entries made by the Scribe were at my, Dr. Dorian Rojas's, direction and personally dictated by me on 08/12/2024 . I have reviewed the chart and agree that the record accurately reflects my personal performance of the history, physical exam, assessment and plan. I have also personally directed, reviewed, and agree with the discharge instructions.

## 2024-10-14 ENCOUNTER — APPOINTMENT (OUTPATIENT)
Dept: PULMONOLOGY | Facility: CLINIC | Age: 74
End: 2024-10-14
Payer: MEDICARE

## 2024-10-14 PROCEDURE — G0008: CPT

## 2024-10-14 PROCEDURE — 90673 RIV3 VACCINE NO PRESERV IM: CPT

## 2024-10-23 ENCOUNTER — APPOINTMENT (OUTPATIENT)
Dept: PULMONOLOGY | Facility: CLINIC | Age: 74
End: 2024-10-23
Payer: MEDICARE

## 2024-10-23 ENCOUNTER — MED ADMIN CHARGE (OUTPATIENT)
Age: 74
End: 2024-10-23

## 2024-10-23 PROCEDURE — G0008: CPT

## 2024-10-23 PROCEDURE — 90673 RIV3 VACCINE NO PRESERV IM: CPT | Mod: NC

## 2025-02-18 ENCOUNTER — APPOINTMENT (OUTPATIENT)
Dept: PULMONOLOGY | Facility: CLINIC | Age: 75
End: 2025-02-18
Payer: MEDICARE

## 2025-02-18 VITALS
DIASTOLIC BLOOD PRESSURE: 80 MMHG | SYSTOLIC BLOOD PRESSURE: 120 MMHG | RESPIRATION RATE: 16 BRPM | OXYGEN SATURATION: 98 % | HEIGHT: 64 IN | WEIGHT: 135 LBS | HEART RATE: 86 BPM | BODY MASS INDEX: 23.05 KG/M2 | TEMPERATURE: 97.7 F

## 2025-02-18 DIAGNOSIS — I31.39 OTHER PERICARDIAL EFFUSION (NONINFLAMMATORY): ICD-10-CM

## 2025-02-18 DIAGNOSIS — Z86.2 PERSONAL HISTORY OF DISEASES OF THE BLOOD AND BLOOD-FORMING ORGANS AND CERTAIN DISORDERS INVOLVING THE IMMUNE MECHANISM: ICD-10-CM

## 2025-02-18 DIAGNOSIS — J82.83 EOSINOPHILIC ASTHMA: ICD-10-CM

## 2025-02-18 DIAGNOSIS — R76.8 OTHER SPECIFIED ABNORMAL IMMUNOLOGICAL FINDINGS IN SERUM: ICD-10-CM

## 2025-02-18 DIAGNOSIS — R06.02 SHORTNESS OF BREATH: ICD-10-CM

## 2025-02-18 DIAGNOSIS — J45.50 SEVERE PERSISTENT ASTHMA, UNCOMPLICATED: ICD-10-CM

## 2025-02-18 DIAGNOSIS — J30.9 ALLERGIC RHINITIS, UNSPECIFIED: ICD-10-CM

## 2025-02-18 PROCEDURE — 99214 OFFICE O/P EST MOD 30 MIN: CPT | Mod: 25

## 2025-02-18 PROCEDURE — 95012 NITRIC OXIDE EXP GAS DETER: CPT

## 2025-02-18 PROCEDURE — 94010 BREATHING CAPACITY TEST: CPT

## 2025-03-14 DIAGNOSIS — R06.02 SHORTNESS OF BREATH: ICD-10-CM

## 2025-03-14 DIAGNOSIS — J82.83 EOSINOPHILIC ASTHMA: ICD-10-CM

## 2025-03-14 DIAGNOSIS — J45.909 UNSPECIFIED ASTHMA, UNCOMPLICATED: ICD-10-CM

## 2025-03-14 DIAGNOSIS — J45.50 SEVERE PERSISTENT ASTHMA, UNCOMPLICATED: ICD-10-CM

## 2025-03-14 DIAGNOSIS — R76.8 OTHER SPECIFIED ABNORMAL IMMUNOLOGICAL FINDINGS IN SERUM: ICD-10-CM

## 2025-03-14 DIAGNOSIS — J30.9 ALLERGIC RHINITIS, UNSPECIFIED: ICD-10-CM

## 2025-05-16 ENCOUNTER — RX RENEWAL (OUTPATIENT)
Age: 75
End: 2025-05-16

## 2025-06-02 NOTE — H&P PST ADULT - BP NONINVASIVE DIASTOLIC (MM HG)
Caller: Yolanda Gaona    Relationship: Self    Best call back number: [phone]     What is the medical concern/diagnosis: BACK ISSUES    What specialty or service is being requested: [facility] FOR [employer] POOL USE      Any additional details: PATIENT WOULD LIKE TO KNOW IF A REFERRAL CAN BE SENT TO TRY TO GET THIS COVERED UNDER MEDICARE.    PLEASE CALL PATIENT TO ADVISE     [phone]

## 2025-07-10 ENCOUNTER — RX RENEWAL (OUTPATIENT)
Age: 75
End: 2025-07-10

## 2025-08-02 ENCOUNTER — NON-APPOINTMENT (OUTPATIENT)
Age: 75
End: 2025-08-02

## 2025-08-04 ENCOUNTER — APPOINTMENT (OUTPATIENT)
Dept: PULMONOLOGY | Facility: CLINIC | Age: 75
End: 2025-08-04
Payer: MEDICARE

## 2025-08-04 VITALS
RESPIRATION RATE: 16 BRPM | HEIGHT: 64 IN | DIASTOLIC BLOOD PRESSURE: 90 MMHG | SYSTOLIC BLOOD PRESSURE: 130 MMHG | WEIGHT: 145 LBS | HEART RATE: 70 BPM | OXYGEN SATURATION: 98 % | TEMPERATURE: 97.1 F | BODY MASS INDEX: 24.75 KG/M2

## 2025-08-04 DIAGNOSIS — J45.909 UNSPECIFIED ASTHMA, UNCOMPLICATED: ICD-10-CM

## 2025-08-04 DIAGNOSIS — J30.9 ALLERGIC RHINITIS, UNSPECIFIED: ICD-10-CM

## 2025-08-04 DIAGNOSIS — R76.8 OTHER SPECIFIED ABNORMAL IMMUNOLOGICAL FINDINGS IN SERUM: ICD-10-CM

## 2025-08-04 DIAGNOSIS — R06.02 SHORTNESS OF BREATH: ICD-10-CM

## 2025-08-04 DIAGNOSIS — U07.1 COVID-19: ICD-10-CM

## 2025-08-04 DIAGNOSIS — J82.83 EOSINOPHILIC ASTHMA: ICD-10-CM

## 2025-08-04 DIAGNOSIS — J45.50 SEVERE PERSISTENT ASTHMA, UNCOMPLICATED: ICD-10-CM

## 2025-08-04 PROCEDURE — 94727 GAS DIL/WSHOT DETER LNG VOL: CPT

## 2025-08-04 PROCEDURE — ZZZZZ: CPT

## 2025-08-04 PROCEDURE — 94729 DIFFUSING CAPACITY: CPT

## 2025-08-04 PROCEDURE — 95012 NITRIC OXIDE EXP GAS DETER: CPT

## 2025-08-04 PROCEDURE — 94010 BREATHING CAPACITY TEST: CPT

## 2025-08-04 PROCEDURE — 99214 OFFICE O/P EST MOD 30 MIN: CPT | Mod: 25

## (undated) DEVICE — TRAP SPECIMEN SPUTUM 40CC

## (undated) DEVICE — SPONGE PEANUT AUTO COUNT

## (undated) DEVICE — GLV 5.5 PROTEXIS (WHITE)

## (undated) DEVICE — ELCTR BOVIE BLADE 3/4" EXTENDED LENGTH 6"

## (undated) DEVICE — DRAPE INSTRUMENT POUCH 6.75" X 11"

## (undated) DEVICE — LIGASURE IMPACT

## (undated) DEVICE — GLV 5.5 PROTEXIS (BLUE)

## (undated) DEVICE — SUT VICRYL 3-0 27" SH UNDYED

## (undated) DEVICE — SUT VICRYL 2-0 27" CT-1 UNDYED

## (undated) DEVICE — Device

## (undated) DEVICE — VENODYNE/SCD SLEEVE CALF MEDIUM

## (undated) DEVICE — FOLEY TRAY 16FR 5CC LF UMETER CLOSED

## (undated) DEVICE — SPONGE DISSECTOR PEANUT

## (undated) DEVICE — SUT QUILL MONODERM 3-0 30CM PS-2

## (undated) DEVICE — DRAPE 3/4 SHEET 52X76"

## (undated) DEVICE — SUT PDS II 1 48" TP-1

## (undated) DEVICE — SUT VICRYL 0 36" CT-1 UNDYED

## (undated) DEVICE — POSITIONER FOAM EGG CRATE ULNAR 2PCS (PINK)

## (undated) DEVICE — SUT VICRYL 0 18" TIES

## (undated) DEVICE — GOWN XL

## (undated) DEVICE — PACK MAJOR ABDOMINAL WITH LAP

## (undated) DEVICE — PREP BETADINE SPONGE STICKS

## (undated) DEVICE — PACK PERI GYN

## (undated) DEVICE — WARMING BLANKET UPPER ADULT

## (undated) DEVICE — ELCTR GROUNDING PAD ADULT COVIDIEN

## (undated) DEVICE — BASIN SET SINGLE

## (undated) DEVICE — SPONGE X-RAY 4X8"

## (undated) DEVICE — LAP PAD W RING 18 X 18"

## (undated) DEVICE — ELCTR BOVIE PENCIL SMOKE EVACUATION

## (undated) DEVICE — DRSG DERMABOND PRINEO 22CM